# Patient Record
Sex: MALE | Race: OTHER | HISPANIC OR LATINO
[De-identification: names, ages, dates, MRNs, and addresses within clinical notes are randomized per-mention and may not be internally consistent; named-entity substitution may affect disease eponyms.]

---

## 2019-12-16 ENCOUNTER — TRANSCRIPTION ENCOUNTER (OUTPATIENT)
Age: 54
End: 2019-12-16

## 2019-12-17 PROBLEM — Z00.00 ENCOUNTER FOR PREVENTIVE HEALTH EXAMINATION: Status: ACTIVE | Noted: 2019-12-17

## 2019-12-19 ENCOUNTER — APPOINTMENT (OUTPATIENT)
Dept: OTOLARYNGOLOGY | Facility: CLINIC | Age: 54
End: 2019-12-19
Payer: COMMERCIAL

## 2019-12-19 VITALS — HEART RATE: 62 BPM | DIASTOLIC BLOOD PRESSURE: 60 MMHG | WEIGHT: 180 LBS | SYSTOLIC BLOOD PRESSURE: 120 MMHG

## 2019-12-19 DIAGNOSIS — Z86.39 PERSONAL HISTORY OF OTHER ENDOCRINE, NUTRITIONAL AND METABOLIC DISEASE: ICD-10-CM

## 2019-12-19 DIAGNOSIS — R22.1 LOCALIZED SWELLING, MASS AND LUMP, NECK: ICD-10-CM

## 2019-12-19 PROCEDURE — 99204 OFFICE O/P NEW MOD 45 MIN: CPT | Mod: 25

## 2019-12-19 PROCEDURE — 31575 DIAGNOSTIC LARYNGOSCOPY: CPT

## 2019-12-19 NOTE — PHYSICAL EXAM
[Laryngoscopy Performed] : laryngoscopy was performed, see procedure section for findings [Midline] : trachea located in midline position [Normal] : no rashes [de-identified] : bulky adenopathy in right neck

## 2019-12-19 NOTE — PROCEDURE
[Unable to Cooperate with Mirror] : patient unable to cooperate with mirror [Lesion] : lesion identified by mirror examination needing further evaluation [Topical Lidocaine] : topical lidocaine [Oxymetazoline HCl] : oxymetazoline HCl [Flexible Endoscope] : examined with the flexible endoscope [Serial Number: ___] : Serial Number: [unfilled] [True Vocal Cords Paralysis] : no true vocal cord paralysis [True Vocal Cords Erythematous] : no true vocal cord edema [True Vocal Cords Macdonald's Nodules] : no true vocal cord nodules [Glottis Arytenoid Cartilages] : no arytenoid granulomas [Glottis Arytenoid Cartilages Erythema] : no arytenoid erythema [Normal] : posterior cricoid area had healthy pink mucosa in the interarytenoid area and the esophageal inlet [Present] : absent

## 2019-12-19 NOTE — REVIEW OF SYSTEMS
[Seasonal Allergies] : seasonal allergies [Sneezing] : sneezing [Throat Pain] : throat pain [Swelling Neck] : swelling neck [Swelling Face] : face swelling [Swollen Glands] : swollen glands [Joint Pain] : joint pain [Negative] : Heme/Lymph [de-identified] : Feel warmer than others [FreeTextEntry2] : Daytime Sleepiness [FreeTextEntry9] : Muscle aches

## 2019-12-19 NOTE — CONSULT LETTER
[Dear  ___] : Dear  [unfilled], [Consult Letter:] : I had the pleasure of evaluating your patient, [unfilled]. [Please see my note below.] : Please see my note below. [Consult Closing:] : Thank you very much for allowing me to participate in the care of this patient.  If you have any questions, please do not hesitate to contact me. [Sincerely,] : Sincerely, [FreeTextEntry2] : Kiya Joshi, DO [FreeTextEntry3] : Rahul Boykin MD, FACS\par Clinical \par Dept. of Otolaryngology and Head & Neck Surgery\par [facility]\par

## 2019-12-19 NOTE — REVIEW OF SYSTEMS
[Seasonal Allergies] : seasonal allergies [Sneezing] : sneezing [Swelling Neck] : swelling neck [Throat Pain] : throat pain [Swelling Face] : face swelling [Swollen Glands] : swollen glands [Joint Pain] : joint pain [Negative] : Endocrine [de-identified] : Feel warmer than others [FreeTextEntry2] : Daytime Sleepiness [FreeTextEntry9] : Muscle aches

## 2019-12-19 NOTE — ASSESSMENT
[FreeTextEntry1] : Pt with right neck mass, suspicious for malignancy.  Pt to get sono guided FNA  and will f/u once it is completed.

## 2019-12-19 NOTE — HISTORY OF PRESENT ILLNESS
[de-identified] : 53 y/o M with a h/o right neck swelling about 4 years ago.  The swelling eventually went away.  3 months ago the swelling recurred.  He expected it to go away as it had before, but it persists.  He states that it recently became mildly tender.

## 2019-12-19 NOTE — HISTORY OF PRESENT ILLNESS
[de-identified] : 55 y/o M with a h/o right neck swelling about 4 years ago.  The swelling eventually went away.  3 months ago the swelling recurred.  He expected it to go away as it had before, but it persists.  He states that it recently became mildly tender.

## 2019-12-19 NOTE — CONSULT LETTER
[Dear  ___] : Dear  [unfilled], [Consult Letter:] : I had the pleasure of evaluating your patient, [unfilled]. [Please see my note below.] : Please see my note below. [Consult Closing:] : Thank you very much for allowing me to participate in the care of this patient.  If you have any questions, please do not hesitate to contact me. [Sincerely,] : Sincerely, [FreeTextEntry2] : Kiya Joshi, DO [FreeTextEntry3] : Rahul Boykin MD, FACS\par Clinical \par Dept. of Otolaryngology and Head & Neck Surgery\par [facility]\par

## 2019-12-19 NOTE — PHYSICAL EXAM
[Laryngoscopy Performed] : laryngoscopy was performed, see procedure section for findings [Midline] : trachea located in midline position [Normal] : no rashes [de-identified] : bulky adenopathy in right neck

## 2019-12-20 ENCOUNTER — OUTPATIENT (OUTPATIENT)
Dept: OUTPATIENT SERVICES | Facility: HOSPITAL | Age: 54
LOS: 1 days | End: 2019-12-20

## 2019-12-20 DIAGNOSIS — Z00.8 ENCOUNTER FOR OTHER GENERAL EXAMINATION: ICD-10-CM

## 2019-12-23 ENCOUNTER — FORM ENCOUNTER (OUTPATIENT)
Age: 54
End: 2019-12-23

## 2019-12-24 ENCOUNTER — APPOINTMENT (OUTPATIENT)
Dept: ULTRASOUND IMAGING | Facility: CLINIC | Age: 54
End: 2019-12-24

## 2019-12-24 ENCOUNTER — OUTPATIENT (OUTPATIENT)
Dept: OUTPATIENT SERVICES | Facility: HOSPITAL | Age: 54
LOS: 1 days | End: 2019-12-24
Payer: MEDICAID

## 2019-12-24 ENCOUNTER — RESULT REVIEW (OUTPATIENT)
Age: 54
End: 2019-12-24

## 2019-12-24 DIAGNOSIS — Z00.8 ENCOUNTER FOR OTHER GENERAL EXAMINATION: ICD-10-CM

## 2019-12-24 PROCEDURE — 10005 FNA BX W/US GDN 1ST LES: CPT

## 2019-12-24 PROCEDURE — 88173 CYTOPATH EVAL FNA REPORT: CPT | Mod: 26

## 2020-01-09 ENCOUNTER — APPOINTMENT (OUTPATIENT)
Dept: OTOLARYNGOLOGY | Facility: CLINIC | Age: 55
End: 2020-01-09
Payer: COMMERCIAL

## 2020-01-09 ENCOUNTER — FORM ENCOUNTER (OUTPATIENT)
Age: 55
End: 2020-01-09

## 2020-01-09 VITALS
BODY MASS INDEX: 30.05 KG/M2 | SYSTOLIC BLOOD PRESSURE: 152 MMHG | OXYGEN SATURATION: 98 % | HEIGHT: 64 IN | DIASTOLIC BLOOD PRESSURE: 90 MMHG | WEIGHT: 176 LBS | HEART RATE: 70 BPM

## 2020-01-09 PROCEDURE — 99214 OFFICE O/P EST MOD 30 MIN: CPT

## 2020-01-09 NOTE — DATA REVIEWED
[de-identified] : Path (Neck mass FNA) - Sono suggests that the R neck mass emanates from the thyroid.  Path = Papillary CA.

## 2020-01-09 NOTE — CONSULT LETTER
[Dear  ___] : Dear  [unfilled], [Courtesy Letter:] : I had the pleasure of seeing your patient, [unfilled], in my office today. [Please see my note below.] : Please see my note below. [Sincerely,] : Sincerely, [Consult Closing:] : Thank you very much for allowing me to participate in the care of this patient.  If you have any questions, please do not hesitate to contact me. [FreeTextEntry2] : Kiya Joshi, DO [FreeTextEntry3] : Rahul Boykin MD, FACS\par Clinical \par Dept. of Otolaryngology and Head & Neck Surgery\par [facility]\par

## 2020-01-09 NOTE — PHYSICAL EXAM
[Midline] : trachea located in midline position [Normal] : no rashes [de-identified] : Bulky adenopathy in R neck

## 2020-01-09 NOTE — ASSESSMENT
[FreeTextEntry1] : Pt to get neck MRI to clarify whether metastatic nodes are present or not. Will schedule surgery once MRI is done.

## 2020-01-10 ENCOUNTER — OUTPATIENT (OUTPATIENT)
Dept: OUTPATIENT SERVICES | Facility: HOSPITAL | Age: 55
LOS: 1 days | End: 2020-01-10
Payer: MEDICAID

## 2020-01-10 ENCOUNTER — APPOINTMENT (OUTPATIENT)
Dept: MRI IMAGING | Facility: CLINIC | Age: 55
End: 2020-01-10
Payer: COMMERCIAL

## 2020-01-10 DIAGNOSIS — C73 MALIGNANT NEOPLASM OF THYROID GLAND: ICD-10-CM

## 2020-01-10 PROCEDURE — 70030 X-RAY EYE FOR FOREIGN BODY: CPT | Mod: 26,50

## 2020-01-10 PROCEDURE — 70250 X-RAY EXAM OF SKULL: CPT | Mod: 26

## 2020-01-10 PROCEDURE — 70542 MRI ORBIT/FACE/NECK W/DYE: CPT | Mod: 26

## 2020-01-16 ENCOUNTER — APPOINTMENT (OUTPATIENT)
Dept: OTOLARYNGOLOGY | Facility: CLINIC | Age: 55
End: 2020-01-16
Payer: COMMERCIAL

## 2020-01-16 VITALS
OXYGEN SATURATION: 98 % | SYSTOLIC BLOOD PRESSURE: 140 MMHG | WEIGHT: 176 LBS | HEART RATE: 78 BPM | BODY MASS INDEX: 30.05 KG/M2 | HEIGHT: 64 IN | DIASTOLIC BLOOD PRESSURE: 80 MMHG

## 2020-01-16 PROCEDURE — 99215 OFFICE O/P EST HI 40 MIN: CPT

## 2020-01-16 NOTE — HISTORY OF PRESENT ILLNESS
[de-identified] : Pt here for f/u after MRI neck.  He has a large right sided papillary thyroid CA and had an MRI to assess for mets.

## 2020-01-16 NOTE — CONSULT LETTER
[Dear  ___] : Dear  [unfilled], [Consult Closing:] : Thank you very much for allowing me to participate in the care of this patient.  If you have any questions, please do not hesitate to contact me. [Please see my note below.] : Please see my note below. [Courtesy Letter:] : I had the pleasure of seeing your patient, [unfilled], in my office today. [FreeTextEntry2] : Kiya Joshi, DO [Sincerely,] : Sincerely, [FreeTextEntry3] : Rahul Boykin MD, FACS\par Clinical \par Dept. of Otolaryngology and Head & Neck Surgery\par [facility]\par

## 2020-01-16 NOTE — DATA REVIEWED
[de-identified] : MRI neck - Images reviewed and show the large cystic thyroid mass.  There is also bilateral adenopathy.

## 2020-01-16 NOTE — ASSESSMENT
[FreeTextEntry1] : Pt will need total thyroidectomy with central and bilateral II - IV dissections.  Risks of thyroid surgery were discussed with patient including, but not limited to:\par Recurrent nerve injury with resulting temporary/permanent hoarseness, and respiratory difficulty\par Parathyroid injury with resulting hypocalcemia and need for long term calcium and Vit. D supplementation\par Rare potential need for tracheostomy\par Tumor recurrence\par Scarring/keloid formation\par Need for additional surgery\par Lifetime need for thyroid hormone replacement\par \par Risks of neck dissection were d/w pt in detail, including, but not limited to, bleeding with potential need for transfusion, infection, scarring. nerve injuries (marginal, hypoglossal, lingual, vagus, phrenic, spinal accessory and cervical sensory branches), and fistula formation.\par \par

## 2020-01-16 NOTE — PHYSICAL EXAM
[de-identified] : Bulky adenopathy in R neck [Midline] : trachea located in midline position [Normal] : no rashes

## 2020-01-27 ENCOUNTER — OUTPATIENT (OUTPATIENT)
Dept: OUTPATIENT SERVICES | Facility: HOSPITAL | Age: 55
LOS: 1 days | End: 2020-01-27
Payer: MEDICAID

## 2020-01-27 VITALS
TEMPERATURE: 98 F | HEART RATE: 73 BPM | SYSTOLIC BLOOD PRESSURE: 170 MMHG | HEIGHT: 65 IN | DIASTOLIC BLOOD PRESSURE: 90 MMHG | OXYGEN SATURATION: 99 % | RESPIRATION RATE: 16 BRPM | WEIGHT: 182.1 LBS

## 2020-01-27 DIAGNOSIS — C73 MALIGNANT NEOPLASM OF THYROID GLAND: ICD-10-CM

## 2020-01-27 DIAGNOSIS — Z98.890 OTHER SPECIFIED POSTPROCEDURAL STATES: Chronic | ICD-10-CM

## 2020-01-27 DIAGNOSIS — Z01.818 ENCOUNTER FOR OTHER PREPROCEDURAL EXAMINATION: ICD-10-CM

## 2020-01-27 LAB
ANION GAP SERPL CALC-SCNC: 14 MMO/L — SIGNIFICANT CHANGE UP (ref 7–14)
BLD GP AB SCN SERPL QL: NEGATIVE — SIGNIFICANT CHANGE UP
BUN SERPL-MCNC: 20 MG/DL — SIGNIFICANT CHANGE UP (ref 7–23)
CALCIUM SERPL-MCNC: 9.2 MG/DL — SIGNIFICANT CHANGE UP (ref 8.4–10.5)
CHLORIDE SERPL-SCNC: 100 MMOL/L — SIGNIFICANT CHANGE UP (ref 98–107)
CO2 SERPL-SCNC: 25 MMOL/L — SIGNIFICANT CHANGE UP (ref 22–31)
CREAT SERPL-MCNC: 0.92 MG/DL — SIGNIFICANT CHANGE UP (ref 0.5–1.3)
GLUCOSE SERPL-MCNC: 92 MG/DL — SIGNIFICANT CHANGE UP (ref 70–99)
HCT VFR BLD CALC: 45.4 % — SIGNIFICANT CHANGE UP (ref 39–50)
HGB BLD-MCNC: 15.3 G/DL — SIGNIFICANT CHANGE UP (ref 13–17)
MCHC RBC-ENTMCNC: 29.4 PG — SIGNIFICANT CHANGE UP (ref 27–34)
MCHC RBC-ENTMCNC: 33.7 % — SIGNIFICANT CHANGE UP (ref 32–36)
MCV RBC AUTO: 87.3 FL — SIGNIFICANT CHANGE UP (ref 80–100)
NRBC # FLD: 0 K/UL — SIGNIFICANT CHANGE UP (ref 0–0)
PLATELET # BLD AUTO: 335 K/UL — SIGNIFICANT CHANGE UP (ref 150–400)
PMV BLD: 9.5 FL — SIGNIFICANT CHANGE UP (ref 7–13)
POTASSIUM SERPL-MCNC: 4 MMOL/L — SIGNIFICANT CHANGE UP (ref 3.5–5.3)
POTASSIUM SERPL-SCNC: 4 MMOL/L — SIGNIFICANT CHANGE UP (ref 3.5–5.3)
RBC # BLD: 5.2 M/UL — SIGNIFICANT CHANGE UP (ref 4.2–5.8)
RBC # FLD: 12.3 % — SIGNIFICANT CHANGE UP (ref 10.3–14.5)
RH IG SCN BLD-IMP: POSITIVE — SIGNIFICANT CHANGE UP
SODIUM SERPL-SCNC: 139 MMOL/L — SIGNIFICANT CHANGE UP (ref 135–145)
WBC # BLD: 7.68 K/UL — SIGNIFICANT CHANGE UP (ref 3.8–10.5)
WBC # FLD AUTO: 7.68 K/UL — SIGNIFICANT CHANGE UP (ref 3.8–10.5)

## 2020-01-27 PROCEDURE — 93010 ELECTROCARDIOGRAM REPORT: CPT

## 2020-01-27 RX ORDER — SODIUM CHLORIDE 9 MG/ML
1000 INJECTION, SOLUTION INTRAVENOUS
Refills: 0 | Status: DISCONTINUED | OUTPATIENT
Start: 2020-01-31 | End: 2020-02-01

## 2020-01-27 NOTE — H&P PST ADULT - SOURCE OF INFORMATION, PROFILE
patient
1. I was told the name of the doctor(s) who took care of my child while in the hospital.    2. I have been told about any important findings on my child's plan of care.    3. The doctor clearly explained my child's diagnosis and other possible diagnoses that were considered.    4. My child's doctor explained all the tests that were done and their results (if available). I understand that some of the test results may not be ready before we go home and I was told how I can get these results. I understand that a summary of my child's hospitalization and important test results will be shared with my child's outpatient doctor.    5. My child's doctor talked to me about what I need to do when we go home.    6. I understand what signs and symptoms to watch for. I understand what symptoms I would need to call my doctor for and/or return to the hospital.    7. I have the phone number to call the hospital for results and/or questions after I leave the hospital.

## 2020-01-27 NOTE — H&P PST ADULT - NSICDXPROBLEM_GEN_ALL_CORE_FT
PROBLEM DIAGNOSES  Problem: Thyroid ca  Assessment and Plan: Pt scheduled for surgery and preop instructions including instructions for taking Famotidine and for Chlorhexidine use in showering on the day of surgery, given verbally and with use of  written materials, and patient confirming understanding of such instructions using  teach back   method.  OR Booking notified of risk for sleep apnea   Pt to see PCP for elevated BP in PST - forms given for [facility]

## 2020-01-27 NOTE — H&P PST ADULT - HISTORY OF PRESENT ILLNESS
Pt is a 54 yr old male scheduled for Total Thyroidectomy with Level VI and Bilateral Level II-IV Neck Dissections Parathyroid Reimplantations with Dr Boykin 1/31/20. Pt states he noted swelling right neck 2 yrs ago but was treated wiht antibiotics and swelling resolved. Pt again noted swelling 2 months ago and had biopsy that was positive for CA - pt c/o of increased size over past month. Pt denies dysphagia or pain.

## 2020-01-27 NOTE — H&P PST ADULT - MALLAMPATI CLASS
Class II - visualization of the soft palate, fauces, and uvula/with phonation with phonation/Class III - visualization of the soft palate and the base of the uvula

## 2020-01-27 NOTE — H&P PST ADULT - ENDOCRINE COMMENTS
Thyroid CA - swelling on right side of neck - pt had Biopsy 12/24/19 - pt states BW done for thyroid function

## 2020-01-27 NOTE — H&P PST ADULT - NEGATIVE ENMT SYMPTOMS
no tinnitus/no vertigo/no sinus symptoms/no throat pain/no dysphagia/no ear pain/no hearing difficulty

## 2020-01-27 NOTE — H&P PST ADULT - NSANTHOSAYNRD_GEN_A_CORE
No. ÁNGEL screening performed.  STOP BANG Legend: 0-2 = LOW Risk; 3-4 = INTERMEDIATE Risk; 5-8 = HIGH Risk

## 2020-01-29 PROBLEM — E66.9 OBESITY, UNSPECIFIED: Chronic | Status: ACTIVE | Noted: 2020-01-27

## 2020-01-29 PROBLEM — C73 MALIGNANT NEOPLASM OF THYROID GLAND: Chronic | Status: ACTIVE | Noted: 2020-01-27

## 2020-01-30 ENCOUNTER — TRANSCRIPTION ENCOUNTER (OUTPATIENT)
Age: 55
End: 2020-01-30

## 2020-01-30 NOTE — ASU PATIENT PROFILE, ADULT - REASON FOR ADMISSION, PROFILE
"Im having a thyroidectomy & maybe a neck dissection on both sides and he might have to take they parathyroid out as well'

## 2020-01-31 ENCOUNTER — APPOINTMENT (OUTPATIENT)
Dept: OTOLARYNGOLOGY | Facility: HOSPITAL | Age: 55
End: 2020-01-31

## 2020-01-31 ENCOUNTER — INPATIENT (INPATIENT)
Facility: HOSPITAL | Age: 55
LOS: 9 days | Discharge: HOME | End: 2020-02-10
Attending: OTOLARYNGOLOGY | Admitting: OTOLARYNGOLOGY
Payer: MEDICAID

## 2020-01-31 ENCOUNTER — RESULT REVIEW (OUTPATIENT)
Age: 55
End: 2020-01-31

## 2020-01-31 VITALS
SYSTOLIC BLOOD PRESSURE: 144 MMHG | WEIGHT: 182.1 LBS | HEART RATE: 76 BPM | OXYGEN SATURATION: 95 % | DIASTOLIC BLOOD PRESSURE: 77 MMHG | RESPIRATION RATE: 16 BRPM | HEIGHT: 65 IN | TEMPERATURE: 98 F

## 2020-01-31 DIAGNOSIS — Z98.890 OTHER SPECIFIED POSTPROCEDURAL STATES: Chronic | ICD-10-CM

## 2020-01-31 DIAGNOSIS — C73 MALIGNANT NEOPLASM OF THYROID GLAND: ICD-10-CM

## 2020-01-31 LAB — RH IG SCN BLD-IMP: POSITIVE — SIGNIFICANT CHANGE UP

## 2020-01-31 PROCEDURE — 38724 REMOVAL OF LYMPH NODES NECK: CPT | Mod: 50,GC

## 2020-01-31 PROCEDURE — 88307 TISSUE EXAM BY PATHOLOGIST: CPT | Mod: 26

## 2020-01-31 PROCEDURE — 60240 REMOVAL OF THYROID: CPT | Mod: GC

## 2020-01-31 PROCEDURE — 88305 TISSUE EXAM BY PATHOLOGIST: CPT | Mod: 26

## 2020-01-31 RX ORDER — FENTANYL CITRATE 50 UG/ML
25 INJECTION INTRAVENOUS
Refills: 0 | Status: DISCONTINUED | OUTPATIENT
Start: 2020-01-31 | End: 2020-02-01

## 2020-01-31 RX ORDER — LEVOTHYROXINE SODIUM 125 MCG
137 TABLET ORAL DAILY
Refills: 0 | Status: DISCONTINUED | OUTPATIENT
Start: 2020-01-31 | End: 2020-02-04

## 2020-01-31 RX ORDER — ONDANSETRON 8 MG/1
4 TABLET, FILM COATED ORAL DAILY
Refills: 0 | Status: DISCONTINUED | OUTPATIENT
Start: 2020-01-31 | End: 2020-02-01

## 2020-01-31 RX ORDER — ACETAMINOPHEN 500 MG
650 TABLET ORAL EVERY 6 HOURS
Refills: 0 | Status: DISCONTINUED | OUTPATIENT
Start: 2020-01-31 | End: 2020-02-10

## 2020-01-31 RX ORDER — LISINOPRIL/HYDROCHLOROTHIAZIDE 10-12.5 MG
1 TABLET ORAL
Qty: 0 | Refills: 0 | DISCHARGE

## 2020-01-31 RX ORDER — HEPARIN SODIUM 5000 [USP'U]/ML
5000 INJECTION INTRAVENOUS; SUBCUTANEOUS EVERY 12 HOURS
Refills: 0 | Status: DISCONTINUED | OUTPATIENT
Start: 2020-01-31 | End: 2020-02-10

## 2020-01-31 RX ORDER — HYDROMORPHONE HYDROCHLORIDE 2 MG/ML
0.5 INJECTION INTRAMUSCULAR; INTRAVENOUS; SUBCUTANEOUS
Refills: 0 | Status: DISCONTINUED | OUTPATIENT
Start: 2020-01-31 | End: 2020-02-01

## 2020-01-31 RX ORDER — HYDROMORPHONE HYDROCHLORIDE 2 MG/ML
1 INJECTION INTRAMUSCULAR; INTRAVENOUS; SUBCUTANEOUS
Refills: 0 | Status: DISCONTINUED | OUTPATIENT
Start: 2020-01-31 | End: 2020-02-01

## 2020-01-31 RX ORDER — CALCIUM CARBONATE 500(1250)
1 TABLET ORAL EVERY 8 HOURS
Refills: 0 | Status: DISCONTINUED | OUTPATIENT
Start: 2020-01-31 | End: 2020-02-01

## 2020-01-31 RX ORDER — OXYCODONE HYDROCHLORIDE 5 MG/1
5 TABLET ORAL EVERY 6 HOURS
Refills: 0 | Status: DISCONTINUED | OUTPATIENT
Start: 2020-01-31 | End: 2020-02-07

## 2020-01-31 RX ORDER — OXYCODONE HYDROCHLORIDE 5 MG/1
10 TABLET ORAL EVERY 6 HOURS
Refills: 0 | Status: DISCONTINUED | OUTPATIENT
Start: 2020-01-31 | End: 2020-02-07

## 2020-01-31 RX ADMIN — SODIUM CHLORIDE 30 MILLILITER(S): 9 INJECTION, SOLUTION INTRAVENOUS at 13:57

## 2020-01-31 NOTE — BRIEF OPERATIVE NOTE - SPECIMENS
R neck dissection 2-4, L neck dissection 2-4, thyroid R thyroid with R neck dissection 2-4, L neck dissection 2-4, L thyroid

## 2020-01-31 NOTE — BRIEF OPERATIVE NOTE - NSICDXBRIEFPROCEDURE_GEN_ALL_CORE_FT
PROCEDURES:  Selective neck dissection 31-Jan-2020 23:18:31  Renata Marsh  Total thyroidectomy 31-Jan-2020 23:18:22  Renata Marsh

## 2020-01-31 NOTE — BRIEF OPERATIVE NOTE - NSICDXBRIEFPOSTOP_GEN_ALL_CORE_FT
POST-OP DIAGNOSIS:  Metastasis to cervical lymph node 02-Feb-2020 15:54:22  Rahul Boykin  Papillary thyroid carcinoma 02-Feb-2020 15:54:11  Rahul Boykin

## 2020-01-31 NOTE — BRIEF OPERATIVE NOTE - NSICDXBRIEFPREOP_GEN_ALL_CORE_FT
PRE-OP DIAGNOSIS:  Metastasis to cervical lymph node 02-Feb-2020 15:53:55  Rahul Boykin  Papillary thyroid carcinoma 02-Feb-2020 15:53:46  Rahul Boykin

## 2020-01-31 NOTE — BRIEF OPERATIVE NOTE - OPERATION/FINDINGS
bilateral neck dissection, total thyroid, sacrifice R IJ Massive Right thyroid tumor, invading strap muscles.  Mass contiguous with matted nodes in R neck.  Nodes invaded R IJ vein.  Both recurrent nerves were spared.  L parathyroids were lateralized and preserved with blood supply intact.

## 2020-02-01 ENCOUNTER — OUTPATIENT (OUTPATIENT)
Dept: OUTPATIENT SERVICES | Facility: HOSPITAL | Age: 55
LOS: 1 days | End: 2020-02-01
Payer: MEDICAID

## 2020-02-01 DIAGNOSIS — Z98.890 OTHER SPECIFIED POSTPROCEDURAL STATES: Chronic | ICD-10-CM

## 2020-02-01 LAB
ANION GAP SERPL CALC-SCNC: 14 MMO/L — SIGNIFICANT CHANGE UP (ref 7–14)
BUN SERPL-MCNC: 19 MG/DL — SIGNIFICANT CHANGE UP (ref 7–23)
CALCIUM SERPL-MCNC: 7.5 MG/DL — LOW (ref 8.4–10.5)
CALCIUM SERPL-MCNC: 7.7 MG/DL — LOW (ref 8.4–10.5)
CALCIUM SERPL-MCNC: 7.9 MG/DL — LOW (ref 8.4–10.5)
CALCIUM SERPL-MCNC: 8 MG/DL — LOW (ref 8.4–10.5)
CHLORIDE SERPL-SCNC: 103 MMOL/L — SIGNIFICANT CHANGE UP (ref 98–107)
CO2 SERPL-SCNC: 21 MMOL/L — LOW (ref 22–31)
CREAT SERPL-MCNC: 1.01 MG/DL — SIGNIFICANT CHANGE UP (ref 0.5–1.3)
GLUCOSE SERPL-MCNC: 139 MG/DL — HIGH (ref 70–99)
HCT VFR BLD CALC: 39 % — SIGNIFICANT CHANGE UP (ref 39–50)
HGB BLD-MCNC: 13.3 G/DL — SIGNIFICANT CHANGE UP (ref 13–17)
MAGNESIUM SERPL-MCNC: 1.4 MG/DL — LOW (ref 1.6–2.6)
MCHC RBC-ENTMCNC: 29.4 PG — SIGNIFICANT CHANGE UP (ref 27–34)
MCHC RBC-ENTMCNC: 34.1 % — SIGNIFICANT CHANGE UP (ref 32–36)
MCV RBC AUTO: 86.3 FL — SIGNIFICANT CHANGE UP (ref 80–100)
NRBC # FLD: 0 K/UL — SIGNIFICANT CHANGE UP (ref 0–0)
PHOSPHATE SERPL-MCNC: 4.5 MG/DL — SIGNIFICANT CHANGE UP (ref 2.5–4.5)
PLATELET # BLD AUTO: 238 K/UL — SIGNIFICANT CHANGE UP (ref 150–400)
PMV BLD: 9.2 FL — SIGNIFICANT CHANGE UP (ref 7–13)
POTASSIUM SERPL-MCNC: 4 MMOL/L — SIGNIFICANT CHANGE UP (ref 3.5–5.3)
POTASSIUM SERPL-SCNC: 4 MMOL/L — SIGNIFICANT CHANGE UP (ref 3.5–5.3)
PTH-INTACT SERPL-MCNC: 4.49 PG/ML — LOW (ref 15–65)
RBC # BLD: 4.52 M/UL — SIGNIFICANT CHANGE UP (ref 4.2–5.8)
RBC # FLD: 12.4 % — SIGNIFICANT CHANGE UP (ref 10.3–14.5)
SODIUM SERPL-SCNC: 138 MMOL/L — SIGNIFICANT CHANGE UP (ref 135–145)
TRIGL FLD-MCNC: 308 MG/DL — SIGNIFICANT CHANGE UP
WBC # BLD: 10.55 K/UL — HIGH (ref 3.8–10.5)
WBC # FLD AUTO: 10.55 K/UL — HIGH (ref 3.8–10.5)

## 2020-02-01 PROCEDURE — G9001: CPT

## 2020-02-01 PROCEDURE — 93010 ELECTROCARDIOGRAM REPORT: CPT

## 2020-02-01 RX ORDER — CALCIUM CARBONATE 500(1250)
2 TABLET ORAL THREE TIMES A DAY
Refills: 0 | Status: DISCONTINUED | OUTPATIENT
Start: 2020-02-01 | End: 2020-02-04

## 2020-02-01 RX ORDER — SENNA PLUS 8.6 MG/1
2 TABLET ORAL AT BEDTIME
Refills: 0 | Status: DISCONTINUED | OUTPATIENT
Start: 2020-02-01 | End: 2020-02-10

## 2020-02-01 RX ORDER — POLYETHYLENE GLYCOL 3350 17 G/17G
17 POWDER, FOR SOLUTION ORAL DAILY
Refills: 0 | Status: DISCONTINUED | OUTPATIENT
Start: 2020-02-01 | End: 2020-02-10

## 2020-02-01 RX ORDER — CALCITRIOL 0.5 UG/1
0.5 CAPSULE ORAL
Refills: 0 | Status: DISCONTINUED | OUTPATIENT
Start: 2020-02-01 | End: 2020-02-04

## 2020-02-01 RX ORDER — CALCIUM CARBONATE 500(1250)
1 TABLET ORAL THREE TIMES A DAY
Refills: 0 | Status: DISCONTINUED | OUTPATIENT
Start: 2020-02-01 | End: 2020-02-01

## 2020-02-01 RX ORDER — MAGNESIUM SULFATE 500 MG/ML
2 VIAL (ML) INJECTION EVERY 4 HOURS
Refills: 0 | Status: COMPLETED | OUTPATIENT
Start: 2020-02-01 | End: 2020-02-01

## 2020-02-01 RX ORDER — CALCIUM CARBONATE 500(1250)
2 TABLET ORAL THREE TIMES A DAY
Refills: 0 | Status: DISCONTINUED | OUTPATIENT
Start: 2020-02-01 | End: 2020-02-01

## 2020-02-01 RX ORDER — CALCIUM CARBONATE 500(1250)
2 TABLET ORAL ONCE
Refills: 0 | Status: COMPLETED | OUTPATIENT
Start: 2020-02-01 | End: 2020-02-01

## 2020-02-01 RX ADMIN — HYDROMORPHONE HYDROCHLORIDE 0.5 MILLIGRAM(S): 2 INJECTION INTRAMUSCULAR; INTRAVENOUS; SUBCUTANEOUS at 02:05

## 2020-02-01 RX ADMIN — Medication 2 TABLET(S): at 15:05

## 2020-02-01 RX ADMIN — Medication 650 MILLIGRAM(S): at 13:36

## 2020-02-01 RX ADMIN — OXYCODONE HYDROCHLORIDE 5 MILLIGRAM(S): 5 TABLET ORAL at 20:50

## 2020-02-01 RX ADMIN — Medication 100 GRAM(S): at 12:04

## 2020-02-01 RX ADMIN — SODIUM CHLORIDE 30 MILLILITER(S): 9 INJECTION, SOLUTION INTRAVENOUS at 00:00

## 2020-02-01 RX ADMIN — OXYCODONE HYDROCHLORIDE 5 MILLIGRAM(S): 5 TABLET ORAL at 05:30

## 2020-02-01 RX ADMIN — HYDROMORPHONE HYDROCHLORIDE 0.5 MILLIGRAM(S): 2 INJECTION INTRAMUSCULAR; INTRAVENOUS; SUBCUTANEOUS at 02:55

## 2020-02-01 RX ADMIN — Medication 2 TABLET(S): at 16:17

## 2020-02-01 RX ADMIN — Medication 100 GRAM(S): at 15:05

## 2020-02-01 RX ADMIN — CALCITRIOL 0.5 MICROGRAM(S): 0.5 CAPSULE ORAL at 19:03

## 2020-02-01 RX ADMIN — HEPARIN SODIUM 5000 UNIT(S): 5000 INJECTION INTRAVENOUS; SUBCUTANEOUS at 06:03

## 2020-02-01 RX ADMIN — OXYCODONE HYDROCHLORIDE 5 MILLIGRAM(S): 5 TABLET ORAL at 05:00

## 2020-02-01 RX ADMIN — HYDROMORPHONE HYDROCHLORIDE 0.5 MILLIGRAM(S): 2 INJECTION INTRAMUSCULAR; INTRAVENOUS; SUBCUTANEOUS at 01:51

## 2020-02-01 RX ADMIN — Medication 137 MICROGRAM(S): at 06:02

## 2020-02-01 RX ADMIN — HYDROMORPHONE HYDROCHLORIDE 0.5 MILLIGRAM(S): 2 INJECTION INTRAMUSCULAR; INTRAVENOUS; SUBCUTANEOUS at 03:10

## 2020-02-01 RX ADMIN — Medication 650 MILLIGRAM(S): at 12:03

## 2020-02-01 RX ADMIN — Medication 2 TABLET(S): at 21:56

## 2020-02-01 RX ADMIN — Medication 650 MILLIGRAM(S): at 19:04

## 2020-02-01 RX ADMIN — HEPARIN SODIUM 5000 UNIT(S): 5000 INJECTION INTRAVENOUS; SUBCUTANEOUS at 19:03

## 2020-02-01 RX ADMIN — Medication 650 MILLIGRAM(S): at 20:50

## 2020-02-01 RX ADMIN — OXYCODONE HYDROCHLORIDE 5 MILLIGRAM(S): 5 TABLET ORAL at 19:30

## 2020-02-01 RX ADMIN — Medication 1 TABLET(S): at 06:02

## 2020-02-01 NOTE — PROVIDER CONTACT NOTE (OTHER) - RECOMMENDATIONS
Ekg , place nc for comfort , pain meds given as per orders . Vital signs assessed . Ent into see patient .

## 2020-02-01 NOTE — PROGRESS NOTE ADULT - SUBJECTIVE AND OBJECTIVE BOX
Patient seen and examined, no acute events overnight. Transferred from PACU once nasal airway removed.     T(C): 37.1 (02-01-20 @ 04:00), Max: 37.1 (02-01-20 @ 04:00)  HR: 98 (02-01-20 @ 06:00) (76 - 101)  BP: 132/83 (02-01-20 @ 06:00) (115/81 - 144/77)  RR: 12 (02-01-20 @ 06:00) (12 - 26)  SpO2: 97% (02-01-20 @ 06:00) (95% - 100%)  Well appearing, NAD  Breathing comfortably on RA, no stridor, no stertor  NC clear anteriorly, no epistaxis  OC mucosa pink and moist,   Neck with horizontal incision, c/d/i, steristrips in place, mild barbie-incisional edema, no hematoma, no crepitus, no skin changes, tender to palpation, bl neck JPs with SS output, R intermittently holding suction - on LIWS   Chvostek negative                          13.3   10.55 )-----------( 238      ( 01 Feb 2020 02:55 )             39.0   02-01    138  |  103  |  19  ----------------------------<  139<H>  4.0   |  21<L>  |  1.01    Ca    8.0<L>      01 Feb 2020 02:55  Phos  4.5     02-01  Mg     1.4     02-01      A/P: 53yo M with PMH thyroid cancer s/p total thyroidectomy, bl SND, central neck dissection 1/31, recovering appropriately.   - continuous pulse ox  - q6h calciums until stable  - continue calcium 1250 TID  - regular diet  - pain control  - bowel regimen  - OOB  - SQH for DVT ppx

## 2020-02-02 LAB
ALBUMIN SERPL ELPH-MCNC: 3.7 G/DL — SIGNIFICANT CHANGE UP (ref 3.3–5)
ANION GAP SERPL CALC-SCNC: 12 MMO/L — SIGNIFICANT CHANGE UP (ref 7–14)
BUN SERPL-MCNC: 20 MG/DL — SIGNIFICANT CHANGE UP (ref 7–23)
CALCIUM SERPL-MCNC: 6.5 MG/DL — CRITICAL LOW (ref 8.4–10.5)
CALCIUM SERPL-MCNC: 6.9 MG/DL — LOW (ref 8.4–10.5)
CALCIUM SERPL-MCNC: 7.2 MG/DL — LOW (ref 8.4–10.5)
CALCIUM SERPL-MCNC: 7.5 MG/DL — LOW (ref 8.4–10.5)
CHLORIDE SERPL-SCNC: 100 MMOL/L — SIGNIFICANT CHANGE UP (ref 98–107)
CO2 SERPL-SCNC: 26 MMOL/L — SIGNIFICANT CHANGE UP (ref 22–31)
CREAT SERPL-MCNC: 1 MG/DL — SIGNIFICANT CHANGE UP (ref 0.5–1.3)
GLUCOSE SERPL-MCNC: 106 MG/DL — HIGH (ref 70–99)
MAGNESIUM SERPL-MCNC: 1.8 MG/DL — SIGNIFICANT CHANGE UP (ref 1.6–2.6)
PHOSPHATE SERPL-MCNC: 3.5 MG/DL — SIGNIFICANT CHANGE UP (ref 2.5–4.5)
POTASSIUM SERPL-MCNC: 3.8 MMOL/L — SIGNIFICANT CHANGE UP (ref 3.5–5.3)
POTASSIUM SERPL-SCNC: 3.8 MMOL/L — SIGNIFICANT CHANGE UP (ref 3.5–5.3)
SODIUM SERPL-SCNC: 138 MMOL/L — SIGNIFICANT CHANGE UP (ref 135–145)

## 2020-02-02 RX ORDER — MAGNESIUM SULFATE 500 MG/ML
2 VIAL (ML) INJECTION ONCE
Refills: 0 | Status: COMPLETED | OUTPATIENT
Start: 2020-02-02 | End: 2020-02-02

## 2020-02-02 RX ORDER — CALCIUM GLUCONATE 100 MG/ML
1 VIAL (ML) INTRAVENOUS
Refills: 0 | Status: COMPLETED | OUTPATIENT
Start: 2020-02-02 | End: 2020-02-02

## 2020-02-02 RX ORDER — POTASSIUM CHLORIDE 20 MEQ
20 PACKET (EA) ORAL ONCE
Refills: 0 | Status: COMPLETED | OUTPATIENT
Start: 2020-02-02 | End: 2020-02-02

## 2020-02-02 RX ORDER — OCTREOTIDE ACETATE 200 UG/ML
100 INJECTION, SOLUTION INTRAVENOUS; SUBCUTANEOUS EVERY 8 HOURS
Refills: 0 | Status: DISCONTINUED | OUTPATIENT
Start: 2020-02-02 | End: 2020-02-07

## 2020-02-02 RX ORDER — CALCIUM GLUCONATE 100 MG/ML
1 VIAL (ML) INTRAVENOUS
Refills: 0 | Status: COMPLETED | OUTPATIENT
Start: 2020-02-02 | End: 2020-02-03

## 2020-02-02 RX ORDER — CALCIUM GLUCONATE 100 MG/ML
2 VIAL (ML) INTRAVENOUS ONCE
Refills: 0 | Status: DISCONTINUED | OUTPATIENT
Start: 2020-02-02 | End: 2020-02-02

## 2020-02-02 RX ORDER — SODIUM CHLORIDE 9 MG/ML
1000 INJECTION, SOLUTION INTRAVENOUS
Refills: 0 | Status: DISCONTINUED | OUTPATIENT
Start: 2020-02-02 | End: 2020-02-02

## 2020-02-02 RX ORDER — OCTREOTIDE ACETATE 200 UG/ML
50 INJECTION, SOLUTION INTRAVENOUS; SUBCUTANEOUS
Refills: 0 | Status: DISCONTINUED | OUTPATIENT
Start: 2020-02-02 | End: 2020-02-02

## 2020-02-02 RX ADMIN — OCTREOTIDE ACETATE 100 MICROGRAM(S): 200 INJECTION, SOLUTION INTRAVENOUS; SUBCUTANEOUS at 14:14

## 2020-02-02 RX ADMIN — Medication 100 GRAM(S): at 23:33

## 2020-02-02 RX ADMIN — Medication 650 MILLIGRAM(S): at 18:19

## 2020-02-02 RX ADMIN — OXYCODONE HYDROCHLORIDE 5 MILLIGRAM(S): 5 TABLET ORAL at 13:01

## 2020-02-02 RX ADMIN — Medication 650 MILLIGRAM(S): at 07:45

## 2020-02-02 RX ADMIN — Medication 50 GRAM(S): at 13:09

## 2020-02-02 RX ADMIN — HEPARIN SODIUM 5000 UNIT(S): 5000 INJECTION INTRAVENOUS; SUBCUTANEOUS at 17:50

## 2020-02-02 RX ADMIN — Medication 2 TABLET(S): at 13:09

## 2020-02-02 RX ADMIN — Medication 650 MILLIGRAM(S): at 07:16

## 2020-02-02 RX ADMIN — OXYCODONE HYDROCHLORIDE 5 MILLIGRAM(S): 5 TABLET ORAL at 03:29

## 2020-02-02 RX ADMIN — CALCITRIOL 0.5 MICROGRAM(S): 0.5 CAPSULE ORAL at 17:50

## 2020-02-02 RX ADMIN — Medication 50 GRAM(S): at 14:15

## 2020-02-02 RX ADMIN — Medication 650 MILLIGRAM(S): at 23:34

## 2020-02-02 RX ADMIN — Medication 137 MICROGRAM(S): at 05:36

## 2020-02-02 RX ADMIN — Medication 2 TABLET(S): at 23:34

## 2020-02-02 RX ADMIN — OCTREOTIDE ACETATE 100 MICROGRAM(S): 200 INJECTION, SOLUTION INTRAVENOUS; SUBCUTANEOUS at 23:34

## 2020-02-02 RX ADMIN — POLYETHYLENE GLYCOL 3350 17 GRAM(S): 17 POWDER, FOR SOLUTION ORAL at 13:01

## 2020-02-02 RX ADMIN — OXYCODONE HYDROCHLORIDE 5 MILLIGRAM(S): 5 TABLET ORAL at 19:47

## 2020-02-02 RX ADMIN — Medication 650 MILLIGRAM(S): at 17:49

## 2020-02-02 RX ADMIN — OXYCODONE HYDROCHLORIDE 5 MILLIGRAM(S): 5 TABLET ORAL at 04:00

## 2020-02-02 RX ADMIN — HEPARIN SODIUM 5000 UNIT(S): 5000 INJECTION INTRAVENOUS; SUBCUTANEOUS at 05:36

## 2020-02-02 RX ADMIN — CALCITRIOL 0.5 MICROGRAM(S): 0.5 CAPSULE ORAL at 05:36

## 2020-02-02 RX ADMIN — Medication 20 MILLIEQUIVALENT(S): at 07:16

## 2020-02-02 RX ADMIN — Medication 2 TABLET(S): at 05:36

## 2020-02-02 RX ADMIN — OXYCODONE HYDROCHLORIDE 5 MILLIGRAM(S): 5 TABLET ORAL at 20:17

## 2020-02-02 RX ADMIN — OXYCODONE HYDROCHLORIDE 5 MILLIGRAM(S): 5 TABLET ORAL at 13:31

## 2020-02-02 RX ADMIN — Medication 50 GRAM(S): at 07:16

## 2020-02-02 NOTE — PROGRESS NOTE ADULT - SUBJECTIVE AND OBJECTIVE BOX
Patient seen and examined at bedside. No acute events overnight. PTH 4.4, cCa 7.9, 8.1, 7.7, 7.7, Alb 3.7. No numbness or tingling in fingertips or toes, no perioral numbness or tingling. Drain output milky, sent for triglycerides 308, now NPO, IVF    Well appearing, NAD  Breathing comfortably on RA, no stridor, no stertor  NC clear anteriorly, no epistaxis  OC mucosa pink and moist,   Neck with horizontal incision, c/d/i, steristrips in place, mild barbie-incisional edema, no hematoma, no crepitus, no skin changes, tender to palpation, bl neck JPs with milkly output (r>L), R intermittently holding suction   Chvostek negative    A/P: 53yo M with PMH thyroid cancer s/p total thyroidectomy, bl SND, central neck dissection 1/31, c/b chyle leak.   - NPO/IVF  - q6h calciums until stable  - continue calcium 2500 TID, rocal bid   - levothyroxine  - pain control  - bowel regimen  - OOB  - SQH for DVT ppx

## 2020-02-03 ENCOUNTER — TRANSCRIPTION ENCOUNTER (OUTPATIENT)
Age: 55
End: 2020-02-03

## 2020-02-03 LAB
ANION GAP SERPL CALC-SCNC: 12 MMO/L — SIGNIFICANT CHANGE UP (ref 7–14)
ANION GAP SERPL CALC-SCNC: 13 MMO/L — SIGNIFICANT CHANGE UP (ref 7–14)
BUN SERPL-MCNC: 12 MG/DL — SIGNIFICANT CHANGE UP (ref 7–23)
BUN SERPL-MCNC: 12 MG/DL — SIGNIFICANT CHANGE UP (ref 7–23)
CALCIUM SERPL-MCNC: 7 MG/DL — LOW (ref 8.4–10.5)
CALCIUM SERPL-MCNC: 7 MG/DL — LOW (ref 8.4–10.5)
CALCIUM SERPL-MCNC: 7.1 MG/DL — LOW (ref 8.4–10.5)
CALCIUM SERPL-MCNC: 7.1 MG/DL — LOW (ref 8.4–10.5)
CHLORIDE SERPL-SCNC: 97 MMOL/L — LOW (ref 98–107)
CHLORIDE SERPL-SCNC: 98 MMOL/L — SIGNIFICANT CHANGE UP (ref 98–107)
CO2 SERPL-SCNC: 26 MMOL/L — SIGNIFICANT CHANGE UP (ref 22–31)
CO2 SERPL-SCNC: 27 MMOL/L — SIGNIFICANT CHANGE UP (ref 22–31)
CREAT SERPL-MCNC: 0.94 MG/DL — SIGNIFICANT CHANGE UP (ref 0.5–1.3)
CREAT SERPL-MCNC: 0.96 MG/DL — SIGNIFICANT CHANGE UP (ref 0.5–1.3)
GLUCOSE SERPL-MCNC: 113 MG/DL — HIGH (ref 70–99)
GLUCOSE SERPL-MCNC: 133 MG/DL — HIGH (ref 70–99)
MAGNESIUM SERPL-MCNC: 1.5 MG/DL — LOW (ref 1.6–2.6)
MAGNESIUM SERPL-MCNC: 1.6 MG/DL — SIGNIFICANT CHANGE UP (ref 1.6–2.6)
PHOSPHATE SERPL-MCNC: 4.4 MG/DL — SIGNIFICANT CHANGE UP (ref 2.5–4.5)
PHOSPHATE SERPL-MCNC: 5 MG/DL — HIGH (ref 2.5–4.5)
POTASSIUM SERPL-MCNC: 4.1 MMOL/L — SIGNIFICANT CHANGE UP (ref 3.5–5.3)
POTASSIUM SERPL-MCNC: 4.3 MMOL/L — SIGNIFICANT CHANGE UP (ref 3.5–5.3)
POTASSIUM SERPL-SCNC: 4.1 MMOL/L — SIGNIFICANT CHANGE UP (ref 3.5–5.3)
POTASSIUM SERPL-SCNC: 4.3 MMOL/L — SIGNIFICANT CHANGE UP (ref 3.5–5.3)
SODIUM SERPL-SCNC: 136 MMOL/L — SIGNIFICANT CHANGE UP (ref 135–145)
SODIUM SERPL-SCNC: 137 MMOL/L — SIGNIFICANT CHANGE UP (ref 135–145)
TRIGL FLD-MCNC: 37 MG/DL — SIGNIFICANT CHANGE UP

## 2020-02-03 RX ORDER — HYDROCHLOROTHIAZIDE 25 MG
25 TABLET ORAL DAILY
Refills: 0 | Status: DISCONTINUED | OUTPATIENT
Start: 2020-02-03 | End: 2020-02-10

## 2020-02-03 RX ORDER — LISINOPRIL 2.5 MG/1
20 TABLET ORAL DAILY
Refills: 0 | Status: DISCONTINUED | OUTPATIENT
Start: 2020-02-03 | End: 2020-02-10

## 2020-02-03 RX ORDER — SENNA PLUS 8.6 MG/1
2 TABLET ORAL AT BEDTIME
Refills: 0 | Status: DISCONTINUED | OUTPATIENT
Start: 2020-02-03 | End: 2020-02-03

## 2020-02-03 RX ADMIN — Medication 2 TABLET(S): at 05:53

## 2020-02-03 RX ADMIN — Medication 2 TABLET(S): at 13:17

## 2020-02-03 RX ADMIN — CALCITRIOL 0.5 MICROGRAM(S): 0.5 CAPSULE ORAL at 05:53

## 2020-02-03 RX ADMIN — LISINOPRIL 20 MILLIGRAM(S): 2.5 TABLET ORAL at 07:23

## 2020-02-03 RX ADMIN — OCTREOTIDE ACETATE 100 MICROGRAM(S): 200 INJECTION, SOLUTION INTRAVENOUS; SUBCUTANEOUS at 13:17

## 2020-02-03 RX ADMIN — Medication 2 TABLET(S): at 21:47

## 2020-02-03 RX ADMIN — HEPARIN SODIUM 5000 UNIT(S): 5000 INJECTION INTRAVENOUS; SUBCUTANEOUS at 17:21

## 2020-02-03 RX ADMIN — Medication 650 MILLIGRAM(S): at 05:52

## 2020-02-03 RX ADMIN — Medication 100 GRAM(S): at 00:46

## 2020-02-03 RX ADMIN — OCTREOTIDE ACETATE 100 MICROGRAM(S): 200 INJECTION, SOLUTION INTRAVENOUS; SUBCUTANEOUS at 05:54

## 2020-02-03 RX ADMIN — Medication 650 MILLIGRAM(S): at 12:45

## 2020-02-03 RX ADMIN — Medication 650 MILLIGRAM(S): at 12:15

## 2020-02-03 RX ADMIN — Medication 650 MILLIGRAM(S): at 20:35

## 2020-02-03 RX ADMIN — CALCITRIOL 0.5 MICROGRAM(S): 0.5 CAPSULE ORAL at 17:23

## 2020-02-03 RX ADMIN — Medication 650 MILLIGRAM(S): at 06:31

## 2020-02-03 RX ADMIN — Medication 25 MILLIGRAM(S): at 07:23

## 2020-02-03 RX ADMIN — Medication 650 MILLIGRAM(S): at 20:05

## 2020-02-03 RX ADMIN — OCTREOTIDE ACETATE 100 MICROGRAM(S): 200 INJECTION, SOLUTION INTRAVENOUS; SUBCUTANEOUS at 21:47

## 2020-02-03 RX ADMIN — HEPARIN SODIUM 5000 UNIT(S): 5000 INJECTION INTRAVENOUS; SUBCUTANEOUS at 05:54

## 2020-02-03 RX ADMIN — Medication 650 MILLIGRAM(S): at 00:05

## 2020-02-03 RX ADMIN — Medication 137 MICROGRAM(S): at 05:53

## 2020-02-03 NOTE — DISCHARGE NOTE PROVIDER - CARE PROVIDER_API CALL
Rahul Boykin)  Otolaryngology  [address] [place] [address]  Phone: [phone]  Fax: [phone]  Follow Up Time:

## 2020-02-03 NOTE — DISCHARGE NOTE PROVIDER - NSDCMRMEDTOKEN_GEN_ALL_CORE_FT
lisinopril-hydrochlorothiazide 20 mg-25 mg oral tablet: 1 tab(s) orally once a day acetaminophen 325 mg oral tablet: 2 tab(s) orally every 6 hours, As needed, Mild Pain (1 - 3) MDD:6  calcitriol 0.5 mcg oral capsule: 2 cap(s) orally every 12 hours  calcium carbonate 1250 mg (500 mg elemental calcium) oral tablet: 4 tab(s) orally 4 times a day   ergocalciferol 50,000 intl units (1.25 mg) oral capsule: 1 cap(s) orally once a week  levothyroxine 150 mcg (0.15 mg) oral tablet: 1 tab(s) orally once a day   lisinopril-hydrochlorothiazide 20 mg-25 mg oral tablet: 1 tab(s) orally once a day  oseltamivir 75 mg oral capsule: 1 cap(s) orally 2 times a day acetaminophen 325 mg oral tablet: 2 tab(s) orally every 6 hours, As needed for pain/fever  calcitriol 0.5 mcg oral capsule: 2 cap(s) orally every 12 hours  calcium carbonate 1250 mg (500 mg elemental calcium) oral tablet: 4 tab(s) orally 4 times a day. Take at 8:45AM, 12:45PM, 6PM, 10PM  ergocalciferol 50,000 intl units (1.25 mg) oral capsule: 1 cap(s) orally once a week  levothyroxine 150 mcg (0.15 mg) oral tablet: 1 tab(s) orally once a day at 5AM  lisinopril-hydrochlorothiazide 20 mg-25 mg oral tablet: 1 tab(s) orally once a day  oseltamivir 75 mg oral capsule: 1 cap(s) orally 2 times a day acetaminophen 325 mg oral tablet: 2 tab(s) orally every 6 hours, As needed for pain/fever  calcitriol 0.5 mcg oral capsule: 2 cap(s) orally every 12 hours  calcium carbonate 1250 mg (500 mg elemental calcium) oral tablet: 4 tab(s) orally 4 times a day. Take at 8:45AM, 12:45PM, 6PM, 10PM  ergocalciferol 50,000 intl units (1.25 mg) oral capsule: 1 cap(s) orally once a week  levothyroxine 150 mcg (0.15 mg) oral tablet: 1 tab(s) orally once a day at 5AM  lisinopril-hydrochlorothiazide 20 mg-25 mg oral tablet: 1 tab(s) orally once a day  oseltamivir 75 mg oral capsule: 1 cap(s) orally 2 times a day  oxyCODONE 5 mg oral tablet: 1 tab(s) orally every 6 hours, As needed for Moderate to severe pain MDD:4 tabs

## 2020-02-03 NOTE — DISCHARGE NOTE PROVIDER - NSDCFUADDAPPT_GEN_ALL_CORE_FT
ENDOCRINOLOGY: Dr. Kylee Reinoso DO  [address] [place], [place] [id number]  Appointment scheduled for 2/24/20 at 11 am.   [phone].

## 2020-02-03 NOTE — DISCHARGE NOTE PROVIDER - NSDCFUSCHEDAPPT_GEN_ALL_CORE_FT
INOCENCIA THRASHER ; 02/24/2020 ; NPP Endocrin [facility] INOCENCIA THRASHER ; 02/24/2020 ; NPP Endocrin [facility] INOCENCIA THRASHER ; 02/24/2020 ; NPP Endocrin [address] [facility] INOCENCIA THRASHER ; 02/24/2020 ; NPP Endocrin [facility]

## 2020-02-03 NOTE — DISCHARGE NOTE PROVIDER - HOSPITAL COURSE
S/P total thyroidectomy with post op chyle leak... S/P total thyroidectomy with post op chyle leak. Calcium levels were trended and patient required multiple Iv doses of IV calcium. Endocrine was consulted and evaluated patient daily. Patient was H flu positive and was transferred to [address] [place] [address]. Cleared for discharge with stable calcium levels on 2/10/20. This is a 53 yo M with HTN, recently diagnosed papillary thyroid cancer admitted after total thyroidectomy, with neck dissection by ENT on 1/31, course c/b continued hypocalcemia. Calcium levels were trended and patient required multiple Iv doses of IV calcium. Endocrine was consulted and evaluated patient daily. Patient was H flu positive and started on Tamiflu. Cleared for discharge with stable calcium levels on 2/10/20 with current medication regimen. Patient has Endo follow-up appointment scheduled for 2/24 with Dr. Kylee Reinoso. This is a 53 yo M with HTN, recently diagnosed papillary thyroid cancer admitted after total thyroidectomy, with neck dissection by ENT on 1/31, course c/b continued hypocalcemia. Calcium levels were trended and patient required multiple Iv doses of IV calcium. Endocrine was consulted and evaluated patient daily. Patient was H flu positive and started on Tamiflu. Cleared for discharge with stable calcium levels on 2/10/20 with current medication regimen. Patient has Endo follow-up appointment scheduled for 2/24 with Dr. Kylee Reinoso.         istop Reference #: [id number]

## 2020-02-03 NOTE — PROGRESS NOTE ADULT - SUBJECTIVE AND OBJECTIVE BOX
Patient seen and examined at bedside.   No acute events overnight.   No numbness or tingling in fingertips or toes, no perioral numbness or tingling.   Tolerating clear liquid diet    Well appearing, NAD  Breathing comfortably on RA, no stridor, no stertor  NC clear anteriorly, no epistaxis  OC mucosa pink and moist,   Neck with horizontal incision, c/d/i, steristrips in place, mild barbie-incisional edema, no hematoma, no crepitus, no skin changes, tender to palpation, bl neck JPs with milky output (r>L), R intermittently holding suction   R ALONZO 52.5  L ALONZO 37.5  Chvostek negative    PTH 4.4  cCa 7.4, 7.1, 7.2  Alb 3.7      A/P: 53yo M with PMH thyroid cancer s/p total thyroidectomy, bl SND, central neck dissection 1/31, c/b chyle leak.   - send L ALONZO fluid for triglycerides  - q6h calciums until stable  - CLD  - continue calcium 2500 TID, rocal bid   - levothyroxine  - pain control  - bowel regimen  - OOB  - SQH for DVT ppx

## 2020-02-03 NOTE — DISCHARGE NOTE PROVIDER - NSDCCPCAREPLAN_GEN_ALL_CORE_FT
PRINCIPAL DISCHARGE DIAGNOSIS  Diagnosis: Thyroid cancer  Assessment and Plan of Treatment: PRINCIPAL DISCHARGE DIAGNOSIS  Diagnosis: Thyroid cancer  Assessment and Plan of Treatment: You had your thyroid removed. Please take your thyroid medication, synthroid or levothyroxine, every morning on an empty stomach. Please follow-up with your ENT surgeon, Dr. Boykin within 1-2 weeks of discharge.      SECONDARY DISCHARGE DIAGNOSES  Diagnosis: Hypocalcemia  Assessment and Plan of Treatment: Your calcium levels were found to be very low after your surgery. Please take your medication supplements as prescribed. Please follow-up with your endocrinologist during your schedule appointment time to have your calcium levels checked.    Diagnosis: Influenza  Assessment and Plan of Treatment: You were diagnosed with the flu. Please continue to take Tamiflu as prescribed to complete a total of 5 days. If you have fever at home, you can take acetaminophen. PRINCIPAL DISCHARGE DIAGNOSIS  Diagnosis: Thyroid cancer  Assessment and Plan of Treatment: You had your thyroid removed. Please take your thyroid medication, synthroid or levothyroxine, every morning on an empty stomach. Please follow-up with your ENT surgeon, Dr. Boykin within 1-2 weeks of discharge.      SECONDARY DISCHARGE DIAGNOSES  Diagnosis: Influenza  Assessment and Plan of Treatment: You were diagnosed with the flu. Please continue to take Tamiflu as prescribed to complete a total of 5 days. If you have fever at home, you can take acetaminophen.    Diagnosis: Hypocalcemia  Assessment and Plan of Treatment: Your calcium levels were found to be very low after your surgery. Please take your medication supplements as prescribed. Please follow-up with Dr. Kylee Reinoso on 2/24 at 11 am to have your calcium levels checked.

## 2020-02-04 DIAGNOSIS — E83.51 HYPOCALCEMIA: ICD-10-CM

## 2020-02-04 DIAGNOSIS — E89.0 POSTPROCEDURAL HYPOTHYROIDISM: ICD-10-CM

## 2020-02-04 DIAGNOSIS — C73 MALIGNANT NEOPLASM OF THYROID GLAND: ICD-10-CM

## 2020-02-04 LAB
ANION GAP SERPL CALC-SCNC: 15 MMO/L — HIGH (ref 7–14)
BUN SERPL-MCNC: 12 MG/DL — SIGNIFICANT CHANGE UP (ref 7–23)
CALCIUM SERPL-MCNC: 6.7 MG/DL — LOW (ref 8.4–10.5)
CALCIUM SERPL-MCNC: 6.9 MG/DL — LOW (ref 8.4–10.5)
CALCIUM SERPL-MCNC: 7.3 MG/DL — LOW (ref 8.4–10.5)
CALCIUM SERPL-MCNC: 7.6 MG/DL — LOW (ref 8.4–10.5)
CHLORIDE SERPL-SCNC: 95 MMOL/L — LOW (ref 98–107)
CO2 SERPL-SCNC: 26 MMOL/L — SIGNIFICANT CHANGE UP (ref 22–31)
CREAT SERPL-MCNC: 1.07 MG/DL — SIGNIFICANT CHANGE UP (ref 0.5–1.3)
GLUCOSE SERPL-MCNC: 120 MG/DL — HIGH (ref 70–99)
MAGNESIUM SERPL-MCNC: 1.5 MG/DL — LOW (ref 1.6–2.6)
PHOSPHATE SERPL-MCNC: 5.8 MG/DL — HIGH (ref 2.5–4.5)
POTASSIUM SERPL-MCNC: 3.8 MMOL/L — SIGNIFICANT CHANGE UP (ref 3.5–5.3)
POTASSIUM SERPL-SCNC: 3.8 MMOL/L — SIGNIFICANT CHANGE UP (ref 3.5–5.3)
SODIUM SERPL-SCNC: 136 MMOL/L — SIGNIFICANT CHANGE UP (ref 135–145)

## 2020-02-04 PROCEDURE — 99223 1ST HOSP IP/OBS HIGH 75: CPT | Mod: GC

## 2020-02-04 RX ORDER — LANOLIN ALCOHOL/MO/W.PET/CERES
3 CREAM (GRAM) TOPICAL AT BEDTIME
Refills: 0 | Status: DISCONTINUED | OUTPATIENT
Start: 2020-02-04 | End: 2020-02-04

## 2020-02-04 RX ORDER — MAGNESIUM SULFATE 500 MG/ML
1 VIAL (ML) INJECTION ONCE
Refills: 0 | Status: COMPLETED | OUTPATIENT
Start: 2020-02-04 | End: 2020-02-04

## 2020-02-04 RX ORDER — CALCIUM CARBONATE 500(1250)
3 TABLET ORAL
Refills: 0 | Status: DISCONTINUED | OUTPATIENT
Start: 2020-02-04 | End: 2020-02-05

## 2020-02-04 RX ORDER — CALCIUM GLUCONATE 100 MG/ML
1 VIAL (ML) INTRAVENOUS ONCE
Refills: 0 | Status: DISCONTINUED | OUTPATIENT
Start: 2020-02-04 | End: 2020-02-04

## 2020-02-04 RX ORDER — MAGNESIUM SULFATE 500 MG/ML
2 VIAL (ML) INJECTION ONCE
Refills: 0 | Status: COMPLETED | OUTPATIENT
Start: 2020-02-04 | End: 2020-02-04

## 2020-02-04 RX ORDER — CALCIUM GLUCONATE 100 MG/ML
1 VIAL (ML) INTRAVENOUS ONCE
Refills: 0 | Status: COMPLETED | OUTPATIENT
Start: 2020-02-04 | End: 2020-02-04

## 2020-02-04 RX ORDER — CALCITRIOL 0.5 UG/1
0.5 CAPSULE ORAL
Refills: 0 | Status: DISCONTINUED | OUTPATIENT
Start: 2020-02-05 | End: 2020-02-07

## 2020-02-04 RX ORDER — CALCITRIOL 0.5 UG/1
1 CAPSULE ORAL
Refills: 0 | Status: DISCONTINUED | OUTPATIENT
Start: 2020-02-04 | End: 2020-02-04

## 2020-02-04 RX ORDER — LEVOTHYROXINE SODIUM 125 MCG
150 TABLET ORAL
Refills: 0 | Status: DISCONTINUED | OUTPATIENT
Start: 2020-02-04 | End: 2020-02-10

## 2020-02-04 RX ORDER — LANOLIN ALCOHOL/MO/W.PET/CERES
5 CREAM (GRAM) TOPICAL AT BEDTIME
Refills: 0 | Status: DISCONTINUED | OUTPATIENT
Start: 2020-02-04 | End: 2020-02-04

## 2020-02-04 RX ORDER — LANOLIN ALCOHOL/MO/W.PET/CERES
3 CREAM (GRAM) TOPICAL AT BEDTIME
Refills: 0 | Status: DISCONTINUED | OUTPATIENT
Start: 2020-02-04 | End: 2020-02-10

## 2020-02-04 RX ADMIN — Medication 650 MILLIGRAM(S): at 02:58

## 2020-02-04 RX ADMIN — Medication 650 MILLIGRAM(S): at 02:28

## 2020-02-04 RX ADMIN — Medication 650 MILLIGRAM(S): at 22:19

## 2020-02-04 RX ADMIN — Medication 50 GRAM(S): at 11:27

## 2020-02-04 RX ADMIN — HEPARIN SODIUM 5000 UNIT(S): 5000 INJECTION INTRAVENOUS; SUBCUTANEOUS at 17:58

## 2020-02-04 RX ADMIN — Medication 650 MILLIGRAM(S): at 14:21

## 2020-02-04 RX ADMIN — LISINOPRIL 20 MILLIGRAM(S): 2.5 TABLET ORAL at 05:11

## 2020-02-04 RX ADMIN — HEPARIN SODIUM 5000 UNIT(S): 5000 INJECTION INTRAVENOUS; SUBCUTANEOUS at 05:11

## 2020-02-04 RX ADMIN — Medication 2 TABLET(S): at 05:11

## 2020-02-04 RX ADMIN — CALCITRIOL 1 MICROGRAM(S): 0.5 CAPSULE ORAL at 17:58

## 2020-02-04 RX ADMIN — Medication 137 MICROGRAM(S): at 07:50

## 2020-02-04 RX ADMIN — Medication 100 GRAM(S): at 11:47

## 2020-02-04 RX ADMIN — Medication 3 MILLIGRAM(S): at 02:39

## 2020-02-04 RX ADMIN — Medication 3 TABLET(S): at 17:58

## 2020-02-04 RX ADMIN — CALCITRIOL 0.5 MICROGRAM(S): 0.5 CAPSULE ORAL at 05:11

## 2020-02-04 RX ADMIN — OCTREOTIDE ACETATE 100 MICROGRAM(S): 200 INJECTION, SOLUTION INTRAVENOUS; SUBCUTANEOUS at 13:50

## 2020-02-04 RX ADMIN — Medication 650 MILLIGRAM(S): at 21:49

## 2020-02-04 RX ADMIN — Medication 25 MILLIGRAM(S): at 05:11

## 2020-02-04 RX ADMIN — Medication 600 MILLIGRAM(S): at 02:21

## 2020-02-04 RX ADMIN — Medication 650 MILLIGRAM(S): at 13:51

## 2020-02-04 RX ADMIN — Medication 3 TABLET(S): at 11:48

## 2020-02-04 RX ADMIN — OCTREOTIDE ACETATE 100 MICROGRAM(S): 200 INJECTION, SOLUTION INTRAVENOUS; SUBCUTANEOUS at 05:11

## 2020-02-04 RX ADMIN — OCTREOTIDE ACETATE 100 MICROGRAM(S): 200 INJECTION, SOLUTION INTRAVENOUS; SUBCUTANEOUS at 21:49

## 2020-02-04 RX ADMIN — Medication 100 GRAM(S): at 02:21

## 2020-02-04 RX ADMIN — Medication 50 GRAM(S): at 10:38

## 2020-02-04 NOTE — CONSULT NOTE ADULT - PROBLEM SELECTOR RECOMMENDATION 2
- Continue with levothyroxine 137 mcg qd, to be given in on empty stomach, wait at least half an hr before eating or taking other meds and should be 4 hrs apart from calcium or MVI - Recommend to increase levothyroxine to 150 mcg qd, to be given in on empty stomach, wait at least half an hr before eating or taking other meds and should be 4 hrs apart from calcium or MVI  - Check TFTs as outpatient in 4 weeks   - Goal TSH 0.5-2 for now, might need more aggressive goal based on path results

## 2020-02-04 NOTE — CONSULT NOTE ADULT - SUBJECTIVE AND OBJECTIVE BOX
HPI:  Pt is a 54 yr old male recently diagnosed with papillary thyroid cancer scheduled for Total Thyroidectomy with Level VI and Bilateral Level II-IV Neck Dissections Parathyroid Reimplantations with Dr Boykin 1/31/20. (27 Jan 2020 07:10)    Endocrine history:      PAST MEDICAL & SURGICAL HISTORY:  Hypertension, unspecified type  Obesity  Thyroid ca  History of ankle surgery: Left ankle fracture - hardware placed      FAMILY HISTORY:  Family history of any hormonal disorders:     Social History:  Smoking, alcohol or illicit drug use:       Outpatient Medications:      MEDICATIONS  (STANDING):  calcitriol   Capsule 1 MICROGram(s) Oral two times a day  calcium carbonate   1250 mG (OsCal) 3 Tablet(s) Oral four times a day  heparin  Injectable 5000 Unit(s) SubCutaneous every 12 hours  hydrochlorothiazide 25 milliGRAM(s) Oral daily  levothyroxine 137 MICROGram(s) Oral daily  lisinopril 20 milliGRAM(s) Oral daily  octreotide  Injectable 100 MICROGram(s) SubCutaneous every 8 hours  polyethylene glycol 3350 17 Gram(s) Oral daily  senna 2 Tablet(s) Oral at bedtime    MEDICATIONS  (PRN):  acetaminophen   Tablet .. 650 milliGRAM(s) Oral every 6 hours PRN Mild Pain (1 - 3)  guaiFENesin  milliGRAM(s) Oral every 12 hours PRN congestion  melatonin 3 milliGRAM(s) Oral at bedtime PRN Insomnia  oxyCODONE    IR 5 milliGRAM(s) Oral every 6 hours PRN Moderate Pain (4 - 6)  oxyCODONE    IR 10 milliGRAM(s) Oral every 6 hours PRN Severe Pain (7 - 10)      Allergies  No Known Allergies      Review of Systems:  Constitutional: No fever, good appetite/po intake  Eyes: No blurry vision, diplopia  Neuro: No tremors  HEENT: No pain  Cardiovascular: No chest pain, palpitations  Respiratory: No SOB, no cough  GI: No nausea, vomiting,   : No dysuria, hematuria  Skin: no rash  Psych: no depression  Endocrine: no polyuria, polydipsia  Hem/lymph: no swelling  Osteoporosis: no fractures    ALL OTHER SYSTEMS REVIEWED AND NEGATIVE      PHYSICAL EXAM:  VITALS: T(C): 36.6 (02-04-20 @ 05:07)  T(F): 97.9 (02-04-20 @ 05:07), Max: 99.4 (02-04-20 @ 01:25)  HR: 72 (02-04-20 @ 05:07) (72 - 85)  BP: 122/89 (02-04-20 @ 05:07) (122/89 - 137/87)  RR:  (16 - 17)  SpO2:  (94% - 97%)  Wt(kg): --  GENERAL: NAD, well-groomed, well-developed  EYES: No proptosis, no lid lag, anicteric  HEENT:  Atraumatic, Normocephalic, moist mucous membranes  THYROID: Normal size, no palpable nodules  RESPIRATORY: Clear to auscultation bilaterally; No rales, rhonchi, wheezing, or rubs  CARDIOVASCULAR: Regular rate and rhythm; No murmurs; no peripheral edema  GI: Soft, nontender, non distended, normal bowel sounds  SKIN: Dry, intact, No rashes or lesions  NEURO: AAO x 3, no gross or focal deficit  PSYCH: reactive affect, euthymic mood        02-04    136  |  95<L>  |  12  ----------------------------<  120<H>  3.8   |  26  |  1.07    EGFR if : 90  EGFR if non : 77    Ca    7.6<L>      02-04  Mg     1.5     02-04  Phos  5.8     02-04    TPro  x   /  Alb  3.7  /  TBili  x   /  DBili  x   /  AST  x   /  ALT  x   /  AlkPhos  x   02-02 HPI:  Pt is a 54 yr old male recently diagnosed with papillary thyroid cancer scheduled for Total Thyroidectomy with Level VI and Bilateral Level II-IV Neck Dissections Parathyroid Reimplantations with Dr Boykin 1/31/20. (27 Jan 2020 07:10)    Endocrine history:  Patient is a 54 year old male who was recently diagnosed with papillary thyroid cancer. Patient initially noted to have right sided neck swelling about 2 years ago but was treated with antibiotics for sore throat and swelling resolved so did not follow up since then. Now again noted rt neck swelling 2 months ago but did not improve and was increasing in size over the past month so underwent FNA which came back positive for papillary thyroid cancer. No prior personal or family history of any thyroid disorders and was never on thyroid medications in past. No history of radiation to head and neck in past. No past use of amiodarone, lithium or biotin supplements reported. Currently does not report any hypothyroid or hyperthyroid symptoms.     Patient underwent total Thyroidectomy with modified neck dissection levels II, III, and IV on 1/31/20. Currently on LT4 137 mcg qd. Also noted to have hypocalcemia and low intact PTH post-op and currently on calcium replacements.       PAST MEDICAL & SURGICAL HISTORY:  Hypertension, unspecified type  Obesity  Thyroid ca  History of ankle surgery: Left ankle fracture - hardware placed      FAMILY HISTORY:  No family history of any thyroid disorders    Social History:  No smoking, alcohol or illicit drug use:       Outpatient Medications:  Lisinopril/ HCTZ 20/25 mg qd      MEDICATIONS  (STANDING):  calcitriol   Capsule 1 MICROGram(s) Oral two times a day  calcium carbonate   1250 mG (OsCal) 3 Tablet(s) Oral four times a day  heparin  Injectable 5000 Unit(s) SubCutaneous every 12 hours  hydrochlorothiazide 25 milliGRAM(s) Oral daily  levothyroxine 137 MICROGram(s) Oral daily  lisinopril 20 milliGRAM(s) Oral daily  octreotide  Injectable 100 MICROGram(s) SubCutaneous every 8 hours  polyethylene glycol 3350 17 Gram(s) Oral daily  senna 2 Tablet(s) Oral at bedtime    MEDICATIONS  (PRN):  acetaminophen   Tablet .. 650 milliGRAM(s) Oral every 6 hours PRN Mild Pain (1 - 3)  guaiFENesin  milliGRAM(s) Oral every 12 hours PRN congestion  melatonin 3 milliGRAM(s) Oral at bedtime PRN Insomnia  oxyCODONE    IR 5 milliGRAM(s) Oral every 6 hours PRN Moderate Pain (4 - 6)  oxyCODONE    IR 10 milliGRAM(s) Oral every 6 hours PRN Severe Pain (7 - 10)      Allergies  No Known Allergies      Review of Systems:  Constitutional: No fever, good appetite/po intake  Eyes: No blurry vision, diplopia  Neuro: No barbie-oral numbness or tingling   HEENT: slight neck pain  Cardiovascular: No chest pain, palpitations  Respiratory: No SOB, no cough  GI: No nausea, vomiting or abdominal pain  : No dysuria, hematuria  Endocrine: no polyuria, polydipsia  Hem/lymph: no swelling    ALL OTHER SYSTEMS REVIEWED AND NEGATIVE      PHYSICAL EXAM:  VITALS: T(C): 36.6 (02-04-20 @ 05:07)  T(F): 97.9 (02-04-20 @ 05:07), Max: 99.4 (02-04-20 @ 01:25)  HR: 72 (02-04-20 @ 05:07) (72 - 85)  BP: 122/89 (02-04-20 @ 05:07) (122/89 - 137/87)  RR:  (16 - 17)  SpO2:  (94% - 97%)  Wt(kg): --  GENERAL: NAD, well-groomed, well-developed  EYES: No proptosis, anicteric  HEENT:  Atraumatic, Normocephalic, moist mucous membranes  NECK: dressing in place, 2 ALONZO drains in place  THYROID: s/p total thyroidectomy   RESPIRATORY: Clear to auscultation bilaterally; No rales, rhonchi, wheezing, or rubs  CARDIOVASCULAR: Regular rate and rhythm; No murmurs; no peripheral edema  GI: Soft, nontender, non distended, normal bowel sounds  SKIN: Dry, intact, No rashes or lesions  NEURO: AAO x 3, no gross or focal deficit  PSYCH: reactive affect, euthymic mood        02-04    136  |  95<L>  |  12  ----------------------------<  120<H>  3.8   |  26  |  1.07    EGFR if : 90  EGFR if non : 77    Ca    7.6<L>      02-04  Mg     1.5     02-04  Phos  5.8     02-04    TPro  x   /  Alb  3.7  /  TBili  x   /  DBili  x   /  AST  x   /  ALT  x   /  AlkPhos  x   02-02      Parathyroid Hormone Intact, Serum (02.01.20 @ 03:00): 4.49 pg/mL HPI:  Pt is a 54 yr old male recently diagnosed with papillary thyroid cancer scheduled for Total Thyroidectomy with Level VI and Bilateral Level II-IV Neck Dissections Parathyroid Reimplantations with Dr Boykin 1/31/20. (27 Jan 2020 07:10)    Endocrine history:  Patient is a 54 year old male who was recently diagnosed with papillary thyroid cancer. Patient initially noted to have right sided neck swelling about 2 years ago but was treated with antibiotics for sore throat and swelling resolved so did not follow up since then. Now again noted rt neck swelling 2 months ago but did not improve and was increasing in size over the past month so underwent MRI neck which showed 6.6 x 5.8 x 4.9 cm cystic mass arising from the upper pole of the right thyroid lobe with an enhancing mural nodule. FNA of right mass came back positive for papillary thyroid cancer. No prior personal or family history of any thyroid disorders and was never on thyroid medications in past. No history of radiation to head and neck in past. No past use of amiodarone, lithium or biotin supplements reported. Currently does not report any hypothyroid or hyperthyroid symptoms.     Patient underwent total Thyroidectomy with modified neck dissection levels II, III, and IV on 1/31/20. Currently on LT4 137 mcg qd. Also noted to have hypocalcemia and low intact PTH post-op and currently on calcium replacements.       PAST MEDICAL & SURGICAL HISTORY:  Hypertension, unspecified type  Obesity  Thyroid ca  History of ankle surgery: Left ankle fracture - hardware placed      FAMILY HISTORY:  No family history of any thyroid disorders    Social History:  No smoking, alcohol or illicit drug use:       Outpatient Medications:  Lisinopril/ HCTZ 20/25 mg qd      MEDICATIONS  (STANDING):  calcitriol   Capsule 1 MICROGram(s) Oral two times a day  calcium carbonate   1250 mG (OsCal) 3 Tablet(s) Oral four times a day  heparin  Injectable 5000 Unit(s) SubCutaneous every 12 hours  hydrochlorothiazide 25 milliGRAM(s) Oral daily  levothyroxine 137 MICROGram(s) Oral daily  lisinopril 20 milliGRAM(s) Oral daily  octreotide  Injectable 100 MICROGram(s) SubCutaneous every 8 hours  polyethylene glycol 3350 17 Gram(s) Oral daily  senna 2 Tablet(s) Oral at bedtime    MEDICATIONS  (PRN):  acetaminophen   Tablet .. 650 milliGRAM(s) Oral every 6 hours PRN Mild Pain (1 - 3)  guaiFENesin  milliGRAM(s) Oral every 12 hours PRN congestion  melatonin 3 milliGRAM(s) Oral at bedtime PRN Insomnia  oxyCODONE    IR 5 milliGRAM(s) Oral every 6 hours PRN Moderate Pain (4 - 6)  oxyCODONE    IR 10 milliGRAM(s) Oral every 6 hours PRN Severe Pain (7 - 10)      Allergies  No Known Allergies      Review of Systems:  Constitutional: No fever, good appetite/po intake  Eyes: No blurry vision, diplopia  Neuro: No barbie-oral numbness or tingling   HEENT: slight neck pain  Cardiovascular: No chest pain, palpitations  Respiratory: No SOB, no cough  GI: No nausea, vomiting or abdominal pain  : No dysuria, hematuria  Endocrine: no polyuria, polydipsia  Hem/lymph: no swelling    ALL OTHER SYSTEMS REVIEWED AND NEGATIVE      PHYSICAL EXAM:  VITALS: T(C): 36.6 (02-04-20 @ 05:07)  T(F): 97.9 (02-04-20 @ 05:07), Max: 99.4 (02-04-20 @ 01:25)  HR: 72 (02-04-20 @ 05:07) (72 - 85)  BP: 122/89 (02-04-20 @ 05:07) (122/89 - 137/87)  RR:  (16 - 17)  SpO2:  (94% - 97%)  Wt(kg): --  GENERAL: NAD, well-groomed, well-developed  EYES: No proptosis, anicteric  HEENT:  Atraumatic, Normocephalic, moist mucous membranes  NECK: dressing in place, 2 ALONZO drains in place  THYROID: s/p total thyroidectomy   RESPIRATORY: Clear to auscultation bilaterally; No rales, rhonchi, wheezing, or rubs  CARDIOVASCULAR: Regular rate and rhythm; No murmurs; no peripheral edema  GI: Soft, nontender, non distended, normal bowel sounds  SKIN: Dry, intact, No rashes or lesions  NEURO: AAO x 3, no gross or focal deficit  PSYCH: reactive affect, euthymic mood        02-04    136  |  95<L>  |  12  ----------------------------<  120<H>  3.8   |  26  |  1.07    EGFR if : 90  EGFR if non : 77    Ca    7.6<L>      02-04  Mg     1.5     02-04  Phos  5.8     02-04    TPro  x   /  Alb  3.7  /  TBili  x   /  DBili  x   /  AST  x   /  ALT  x   /  AlkPhos  x   02-02      Parathyroid Hormone Intact, Serum (02.01.20 @ 03:00): 4.49 pg/mL

## 2020-02-04 NOTE — PROGRESS NOTE ADULT - SUBJECTIVE AND OBJECTIVE BOX
Patient seen and examined at bedside.   No acute events overnight.   had some perioral tingling overnight, but not in the am  Tolerating clear liquid diet    Vital Signs Last 24 Hrs  T(C): 36.6 (04 Feb 2020 05:07), Max: 37.4 (04 Feb 2020 01:25)  T(F): 97.9 (04 Feb 2020 05:07), Max: 99.4 (04 Feb 2020 01:25)  HR: 72 (04 Feb 2020 05:07) (72 - 85)  BP: 122/89 (04 Feb 2020 05:07) (122/89 - 143/94)  BP(mean): --  RR: 17 (04 Feb 2020 05:07) (16 - 17)  SpO2: 95% (04 Feb 2020 05:07) (94% - 97%)    Well appearing, NAD  Breathing comfortably on RA, no stridor, no stertor  NC clear anteriorly, no epistaxis  OC mucosa pink and moist,   Neck with horizontal incision, c/d/i, steristrips in place, mild barbie-incisional edema, no hematoma, no crepitus, no skin changes, tender to palpation, bl neck JPs with milky output (r>L), R intermittently holding suction   R ALONZO 52.5  L ALONZO 37.5  Chvostek positive this am b/l    PTH 4.4  cCa 7.4, 7.1, 7.2, 6.9  Alb 3.7  Triglycerides, Fluid: 37: NO REFERENCE RANGE AVAILABLE. mg/dL (02.03.20 @ 10:15)      A/P: 55yo M with PMH thyroid cancer s/p total thyroidectomy, bl SND, central neck dissection 1/31, c/b chyle leak.   - q6h calciums until stable  - CLD  - continue calcium 2500 TID, rocal increase dose to 1BID  - levothyroxine  - pain control  - bowel regimen  - OOB  - SQH for DVT ppx

## 2020-02-04 NOTE — CONSULT NOTE ADULT - PROBLEM SELECTOR RECOMMENDATION 3
- s/p Total Thyroidectomy with modified neck dissection levels II, III, and IV on 1/31/20.  - follow up path result  - Continue with levothyroxine 137 mcg qd, as mentioned above. - s/p Total Thyroidectomy with modified neck dissection levels II, III, and IV on 1/31/20.  - follow up path result, based on that will consider KATE treatment as outpatient   - Continue with levothyroxine 150 mcg qd, as mentioned above.  Patient can follow up with [facility]  [address] [place] [address], [address], [place] [address]  [phone]

## 2020-02-04 NOTE — CONSULT NOTE ADULT - ATTENDING COMMENTS
54M PTC (6cm with + LN) s/p total thyroidectomy, postoperative hypothyroidism and hypoparathyroidism.  Agree with increase levothyroxine for mild TSH suppression target, but follow up pathology. Hypocalcemia - increase calcium carbonate and give with food. Calcitriol dosing limited by hyperphosphatemia. Will need outpatient endocrine follow up

## 2020-02-04 NOTE — CONSULT NOTE ADULT - PROBLEM SELECTOR RECOMMENDATION 9
- Secondary to post op hypoparathyroidism, intact PTH post op 4.49.   - Calcium level most recent noted to be 7.6; goal is 8-8.5   - Patient is currently not symptomatic   - Continue with increased dose of calcium carbonate: 1250 mg -->3 tabs 4 times a day.  - Continue with calcitriol 1 mcg bid  - Check Vitamin D 25-OH and 1,25-di OH levels   - Monitor serum calcium q8h along with daily albumin - Secondary to post op hypoparathyroidism, intact PTH post op 4.49.   - Calcium level most recent noted to be 7.6; goal is 8-8.5   - Patient is currently not symptomatic   - Continue with increased dose of calcium carbonate: 1250 mg -->3 tabs 4 times a day (with meals and at bedtime)  - Would recommend to decrease calcitriol to 0.5 mcg bid as phos level has been elevated, if persistently high would decrease further down to 0.5 mcg qd.  - Check Vitamin D 25-OH and 1,25-di OH levels   - Monitor serum calcium q8h along with daily albumin  - Can give prn calcium gluconate if serum Ca <7 or patient is symptomatic.

## 2020-02-05 LAB
ALBUMIN SERPL ELPH-MCNC: 3.8 G/DL — SIGNIFICANT CHANGE UP (ref 3.3–5)
ALBUMIN SERPL ELPH-MCNC: 3.9 G/DL — SIGNIFICANT CHANGE UP (ref 3.3–5)
ANION GAP SERPL CALC-SCNC: 13 MMO/L — SIGNIFICANT CHANGE UP (ref 7–14)
ANION GAP SERPL CALC-SCNC: 16 MMO/L — HIGH (ref 7–14)
ANION GAP SERPL CALC-SCNC: 16 MMO/L — HIGH (ref 7–14)
BUN SERPL-MCNC: 18 MG/DL — SIGNIFICANT CHANGE UP (ref 7–23)
BUN SERPL-MCNC: 19 MG/DL — SIGNIFICANT CHANGE UP (ref 7–23)
BUN SERPL-MCNC: 22 MG/DL — SIGNIFICANT CHANGE UP (ref 7–23)
CALCIUM SERPL-MCNC: 6.8 MG/DL — LOW (ref 8.4–10.5)
CALCIUM SERPL-MCNC: 7 MG/DL — LOW (ref 8.4–10.5)
CHLORIDE SERPL-SCNC: 94 MMOL/L — LOW (ref 98–107)
CHLORIDE SERPL-SCNC: 95 MMOL/L — LOW (ref 98–107)
CHLORIDE SERPL-SCNC: 97 MMOL/L — LOW (ref 98–107)
CO2 SERPL-SCNC: 26 MMOL/L — SIGNIFICANT CHANGE UP (ref 22–31)
CO2 SERPL-SCNC: 26 MMOL/L — SIGNIFICANT CHANGE UP (ref 22–31)
CO2 SERPL-SCNC: 28 MMOL/L — SIGNIFICANT CHANGE UP (ref 22–31)
CREAT SERPL-MCNC: 1.04 MG/DL — SIGNIFICANT CHANGE UP (ref 0.5–1.3)
CREAT SERPL-MCNC: 1.09 MG/DL — SIGNIFICANT CHANGE UP (ref 0.5–1.3)
CREAT SERPL-MCNC: 1.16 MG/DL — SIGNIFICANT CHANGE UP (ref 0.5–1.3)
GLUCOSE SERPL-MCNC: 114 MG/DL — HIGH (ref 70–99)
GLUCOSE SERPL-MCNC: 122 MG/DL — HIGH (ref 70–99)
GLUCOSE SERPL-MCNC: 143 MG/DL — HIGH (ref 70–99)
HCT VFR BLD CALC: 39 % — SIGNIFICANT CHANGE UP (ref 39–50)
HGB BLD-MCNC: 13.4 G/DL — SIGNIFICANT CHANGE UP (ref 13–17)
MAGNESIUM SERPL-MCNC: 1.7 MG/DL — SIGNIFICANT CHANGE UP (ref 1.6–2.6)
MAGNESIUM SERPL-MCNC: 1.8 MG/DL — SIGNIFICANT CHANGE UP (ref 1.6–2.6)
MAGNESIUM SERPL-MCNC: 1.8 MG/DL — SIGNIFICANT CHANGE UP (ref 1.6–2.6)
MCHC RBC-ENTMCNC: 29.3 PG — SIGNIFICANT CHANGE UP (ref 27–34)
MCHC RBC-ENTMCNC: 34.4 % — SIGNIFICANT CHANGE UP (ref 32–36)
MCV RBC AUTO: 85.3 FL — SIGNIFICANT CHANGE UP (ref 80–100)
NRBC # FLD: 0 K/UL — SIGNIFICANT CHANGE UP (ref 0–0)
PHOSPHATE SERPL-MCNC: 5 MG/DL — HIGH (ref 2.5–4.5)
PHOSPHATE SERPL-MCNC: 5.8 MG/DL — HIGH (ref 2.5–4.5)
PHOSPHATE SERPL-MCNC: 5.8 MG/DL — HIGH (ref 2.5–4.5)
PLATELET # BLD AUTO: 270 K/UL — SIGNIFICANT CHANGE UP (ref 150–400)
PMV BLD: 8.9 FL — SIGNIFICANT CHANGE UP (ref 7–13)
POTASSIUM SERPL-MCNC: 4 MMOL/L — SIGNIFICANT CHANGE UP (ref 3.5–5.3)
POTASSIUM SERPL-MCNC: 4.1 MMOL/L — SIGNIFICANT CHANGE UP (ref 3.5–5.3)
POTASSIUM SERPL-MCNC: 4.2 MMOL/L — SIGNIFICANT CHANGE UP (ref 3.5–5.3)
POTASSIUM SERPL-SCNC: 4 MMOL/L — SIGNIFICANT CHANGE UP (ref 3.5–5.3)
POTASSIUM SERPL-SCNC: 4.1 MMOL/L — SIGNIFICANT CHANGE UP (ref 3.5–5.3)
POTASSIUM SERPL-SCNC: 4.2 MMOL/L — SIGNIFICANT CHANGE UP (ref 3.5–5.3)
RBC # BLD: 4.57 M/UL — SIGNIFICANT CHANGE UP (ref 4.2–5.8)
RBC # FLD: 11.9 % — SIGNIFICANT CHANGE UP (ref 10.3–14.5)
SODIUM SERPL-SCNC: 136 MMOL/L — SIGNIFICANT CHANGE UP (ref 135–145)
SODIUM SERPL-SCNC: 136 MMOL/L — SIGNIFICANT CHANGE UP (ref 135–145)
SODIUM SERPL-SCNC: 139 MMOL/L — SIGNIFICANT CHANGE UP (ref 135–145)
WBC # BLD: 6.13 K/UL — SIGNIFICANT CHANGE UP (ref 3.8–10.5)
WBC # FLD AUTO: 6.13 K/UL — SIGNIFICANT CHANGE UP (ref 3.8–10.5)

## 2020-02-05 PROCEDURE — 99233 SBSQ HOSP IP/OBS HIGH 50: CPT | Mod: GC

## 2020-02-05 RX ORDER — CALCIUM CARBONATE 500(1250)
3 TABLET ORAL
Refills: 0 | Status: DISCONTINUED | OUTPATIENT
Start: 2020-02-05 | End: 2020-02-06

## 2020-02-05 RX ORDER — CALCIUM CARBONATE 500(1250)
3 TABLET ORAL
Refills: 0 | Status: DISCONTINUED | OUTPATIENT
Start: 2020-02-05 | End: 2020-02-05

## 2020-02-05 RX ADMIN — Medication 3 TABLET(S): at 22:10

## 2020-02-05 RX ADMIN — HEPARIN SODIUM 5000 UNIT(S): 5000 INJECTION INTRAVENOUS; SUBCUTANEOUS at 05:54

## 2020-02-05 RX ADMIN — Medication 3 TABLET(S): at 05:55

## 2020-02-05 RX ADMIN — Medication 25 MILLIGRAM(S): at 05:54

## 2020-02-05 RX ADMIN — CALCITRIOL 0.5 MICROGRAM(S): 0.5 CAPSULE ORAL at 05:55

## 2020-02-05 RX ADMIN — Medication 650 MILLIGRAM(S): at 09:43

## 2020-02-05 RX ADMIN — Medication 3 TABLET(S): at 18:26

## 2020-02-05 RX ADMIN — Medication 650 MILLIGRAM(S): at 10:13

## 2020-02-05 RX ADMIN — OCTREOTIDE ACETATE 100 MICROGRAM(S): 200 INJECTION, SOLUTION INTRAVENOUS; SUBCUTANEOUS at 05:53

## 2020-02-05 RX ADMIN — Medication 650 MILLIGRAM(S): at 22:39

## 2020-02-05 RX ADMIN — CALCITRIOL 0.5 MICROGRAM(S): 0.5 CAPSULE ORAL at 18:24

## 2020-02-05 RX ADMIN — Medication 3 TABLET(S): at 00:51

## 2020-02-05 RX ADMIN — Medication 650 MILLIGRAM(S): at 22:09

## 2020-02-05 RX ADMIN — OCTREOTIDE ACETATE 100 MICROGRAM(S): 200 INJECTION, SOLUTION INTRAVENOUS; SUBCUTANEOUS at 14:15

## 2020-02-05 RX ADMIN — Medication 3 TABLET(S): at 12:27

## 2020-02-05 RX ADMIN — Medication 150 MICROGRAM(S): at 06:02

## 2020-02-05 RX ADMIN — Medication 3 MILLIGRAM(S): at 00:04

## 2020-02-05 RX ADMIN — OCTREOTIDE ACETATE 100 MICROGRAM(S): 200 INJECTION, SOLUTION INTRAVENOUS; SUBCUTANEOUS at 22:10

## 2020-02-05 RX ADMIN — HEPARIN SODIUM 5000 UNIT(S): 5000 INJECTION INTRAVENOUS; SUBCUTANEOUS at 18:24

## 2020-02-05 RX ADMIN — LISINOPRIL 20 MILLIGRAM(S): 2.5 TABLET ORAL at 05:54

## 2020-02-05 NOTE — PROGRESS NOTE ADULT - PROBLEM SELECTOR PLAN 2
- Continue with levothyroxine 150 mcg qd, to be given in on empty stomach, wait at least half an hr before eating or taking other meds and should be 4 hrs apart from calcium or MVI (Noted to be given along with Ca today at 6 am, spoke to RN and reinforced to be given LT4 at 5 am and Ca with meals)  - Check TFTs as outpatient in 4 weeks   - Goal TSH 0.5-2 for now, might need more aggressive goal based on path results.

## 2020-02-05 NOTE — PROGRESS NOTE ADULT - PROBLEM SELECTOR PLAN 1
- Secondary to post op hypoparathyroidism, intact PTH post op 4.49.   - Calcium level most recent noted to be 6.8; goal is 8-8.5   - Patient is currently not symptomatic   - Continue with calcium carbonate: 1250 mg -->3 tabs 4 times a day (with meals and at bedtime). Spoke to RN to make sure its given with meals and apart from LT4  - Continue calcitriol to 0.5 mcg bid, would avoid increase as phos level has been elevated, if persistently high would decrease further down to 0.5 mcg qd.  - Check Vitamin D 25-OH and 1,25-di OH levels   - Monitor serum calcium q8h along with daily albumin, Mg and phos  - Mg today noted to be wnl, 1.8. If low can consider Mg oxide 400 mg tid with meals  - Can give prn calcium gluconate if serum Ca <7 or patient is symptomatic.

## 2020-02-05 NOTE — CHART NOTE - NSCHARTNOTEFT_GEN_A_CORE
Procedure note:  Verbal consent obtained from patient    Bilateral ALONZO removed without any complication, patient tolerated well

## 2020-02-05 NOTE — PROGRESS NOTE ADULT - ASSESSMENT
54 yr old male recently diagnosed with papillary thyroid cancer underwent Total Thyroidectomy with modified neck dissection levels II, III, and IV on 1/31/20.  Endocrine team consulted for post op hypocalcemia and post op hypothyroidism. 54 yr old male recently diagnosed with papillary thyroid cancer underwent Total Thyroidectomy with modified neck dissection levels II, III, and IV on 1/31/20.  Endocrine team following for post op hypocalcemia and post op hypothyroidism.

## 2020-02-05 NOTE — PROGRESS NOTE ADULT - PROBLEM SELECTOR PLAN 3
- s/p Total Thyroidectomy with modified neck dissection levels II, III, and IV on 1/31/20 for 6 m PTC with LN involvement.  - follow up path result, based on that will consider KATE treatment as outpatient   - Continue with levothyroxine 150 mcg qd, as mentioned above.  Patient can follow up with [facility]  [address], [address], [place] [address]  [phone].

## 2020-02-05 NOTE — PROGRESS NOTE ADULT - SUBJECTIVE AND OBJECTIVE BOX
Patient seen and examined at bedside.   No acute events overnight.   Tolerating clear liquid diet    Vital Signs Last 24 Hrs  T(C): 37.7 (05 Feb 2020 05:46), Max: 37.7 (05 Feb 2020 05:46)  T(F): 99.8 (05 Feb 2020 05:46), Max: 99.8 (05 Feb 2020 05:46)  HR: 72 (05 Feb 2020 05:46) (70 - 83)  BP: 129/90 (05 Feb 2020 05:46) (116/79 - 133/86)  BP(mean): --  RR: 16 (05 Feb 2020 05:46) (14 - 17)  SpO2: 97% (05 Feb 2020 05:46) (95% - 99%)    Well appearing, NAD  Breathing comfortably on RA, no stridor, no stertor  NC clear anteriorly, no epistaxis  OC mucosa pink and moist,   Neck with horizontal incision, c/d/i, steristrips in place, mild barbie-incisional edema, no hematoma, no crepitus, no skin changes, tender to palpation, bl neck JPs with milky output (r>L), R intermittently holding suction   R ALONZO 52.5  L ALONZO 37.5  Chvostek positive this am b/l    PTH 4.4  cCa 7.8,7.5,7.2  Alb 3.7  Triglycerides, Fluid: 37: NO REFERENCE RANGE AVAILABLE. mg/dL (02.03.20 @ 10:15)      A/P: 53yo M with PMH thyroid cancer s/p total thyroidectomy, bl SND, central neck dissection 1/31, c/b chyle leak.   - q6h calciums until stable  - CLD  - continue calcium 3750QID, rocal .5BID  - levothyroxine  - pain control  - bowel regimen  - OOB  - SQH for DVT ppx

## 2020-02-05 NOTE — PROGRESS NOTE ADULT - SUBJECTIVE AND OBJECTIVE BOX
Follow-up on: Post op hypothyroidism and hypocalcemia.    Subjective:    MEDICATIONS  (STANDING):  calcitriol   Capsule 0.5 MICROGram(s) Oral two times a day  calcium carbonate   1250 mG (OsCal) 3 Tablet(s) Oral four times a day  heparin  Injectable 5000 Unit(s) SubCutaneous every 12 hours  hydrochlorothiazide 25 milliGRAM(s) Oral daily  levothyroxine 150 MICROGram(s) Oral <User Schedule>  lisinopril 20 milliGRAM(s) Oral daily  octreotide  Injectable 100 MICROGram(s) SubCutaneous every 8 hours  polyethylene glycol 3350 17 Gram(s) Oral daily  senna 2 Tablet(s) Oral at bedtime    MEDICATIONS  (PRN):  acetaminophen   Tablet .. 650 milliGRAM(s) Oral every 6 hours PRN Mild Pain (1 - 3)  guaiFENesin  milliGRAM(s) Oral every 12 hours PRN congestion  melatonin 3 milliGRAM(s) Oral at bedtime PRN Insomnia  oxyCODONE    IR 5 milliGRAM(s) Oral every 6 hours PRN Moderate Pain (4 - 6)  oxyCODONE    IR 10 milliGRAM(s) Oral every 6 hours PRN Severe Pain (7 - 10)      PHYSICAL EXAM:  VITALS: T(C): 36.8 (02-05-20 @ 14:00)  T(F): 98.3 (02-05-20 @ 14:00), Max: 101.1 (02-05-20 @ 09:41)  HR: 70 (02-05-20 @ 14:00) (70 - 86)  BP: 110/70 (02-05-20 @ 14:00) (110/70 - 129/90)  RR:  (16 - 18)  SpO2:  (94% - 98%)  Wt(kg): --  GENERAL: NAD, well-groomed, well-developed  RESPIRATORY: Clear to auscultation bilaterally; No rales, rhonchi, wheezing, or rubs  CARDIOVASCULAR: Regular rate and rhythm; No murmurs; no peripheral edema  GI: Soft, nontender, non distended, normal bowel sounds      02-05    136  |  97<L>  |  19  ----------------------------<  114<H>  4.0   |  26  |  1.04    EGFR if : 93  EGFR if non : 80    Ca    6.8<L>      02-05  Mg     1.8     02-05  Phos  5.0     02-05 Follow-up on: Post op hypothyroidism and hypocalcemia.    Subjective: Patient seen at bedside, says he is doing well. Currently does not report any numbness or tingling. Otherwise feeling fine.     MEDICATIONS  (STANDING):  calcitriol   Capsule 0.5 MICROGram(s) Oral two times a day  calcium carbonate   1250 mG (OsCal) 3 Tablet(s) Oral four times a day  heparin  Injectable 5000 Unit(s) SubCutaneous every 12 hours  hydrochlorothiazide 25 milliGRAM(s) Oral daily  levothyroxine 150 MICROGram(s) Oral <User Schedule>  lisinopril 20 milliGRAM(s) Oral daily  octreotide  Injectable 100 MICROGram(s) SubCutaneous every 8 hours  polyethylene glycol 3350 17 Gram(s) Oral daily  senna 2 Tablet(s) Oral at bedtime    MEDICATIONS  (PRN):  acetaminophen   Tablet .. 650 milliGRAM(s) Oral every 6 hours PRN Mild Pain (1 - 3)  guaiFENesin  milliGRAM(s) Oral every 12 hours PRN congestion  melatonin 3 milliGRAM(s) Oral at bedtime PRN Insomnia  oxyCODONE    IR 5 milliGRAM(s) Oral every 6 hours PRN Moderate Pain (4 - 6)  oxyCODONE    IR 10 milliGRAM(s) Oral every 6 hours PRN Severe Pain (7 - 10)      PHYSICAL EXAM:  VITALS: T(C): 36.8 (02-05-20 @ 14:00)  T(F): 98.3 (02-05-20 @ 14:00), Max: 101.1 (02-05-20 @ 09:41)  HR: 70 (02-05-20 @ 14:00) (70 - 86)  BP: 110/70 (02-05-20 @ 14:00) (110/70 - 129/90)  RR:  (16 - 18)  SpO2:  (94% - 98%)  Wt(kg): --  GENERAL: NAD, well-groomed, well-developed  RESPIRATORY: Clear to auscultation bilaterally; No rales, rhonchi, wheezing, or rubs  CARDIOVASCULAR: Regular rate and rhythm; No murmurs; no peripheral edema  GI: Soft, nontender, non distended, normal bowel sounds      02-05    136  |  97<L>  |  19  ----------------------------<  114<H>  4.0   |  26  |  1.04    EGFR if : 93  EGFR if non : 80    Ca    6.8<L>      02-05  Mg     1.8     02-05  Phos  5.0     02-05

## 2020-02-05 NOTE — PROVIDER CONTACT NOTE (OTHER) - ACTION/TREATMENT ORDERED:
Sacha MARTIN made aware; verbally ordered to administer 650mg of acetaminophen and continue to monitor. Will continue to monitor.

## 2020-02-06 PROBLEM — I10 ESSENTIAL (PRIMARY) HYPERTENSION: Chronic | Status: ACTIVE | Noted: 2020-01-31

## 2020-02-06 LAB
24R-OH-CALCIDIOL SERPL-MCNC: 16.8 NG/ML — LOW (ref 30–80)
ANION GAP SERPL CALC-SCNC: 14 MMO/L — SIGNIFICANT CHANGE UP (ref 7–14)
APPEARANCE UR: CLEAR — SIGNIFICANT CHANGE UP
BILIRUB UR-MCNC: NEGATIVE — SIGNIFICANT CHANGE UP
BLOOD UR QL VISUAL: NEGATIVE — SIGNIFICANT CHANGE UP
BUN SERPL-MCNC: 20 MG/DL — SIGNIFICANT CHANGE UP (ref 7–23)
CALCIUM SERPL-MCNC: 6.7 MG/DL — LOW (ref 8.4–10.5)
CALCIUM SERPL-MCNC: 6.7 MG/DL — LOW (ref 8.4–10.5)
CALCIUM SERPL-MCNC: 6.8 MG/DL — LOW (ref 8.4–10.5)
CALCIUM SERPL-MCNC: 6.8 MG/DL — LOW (ref 8.4–10.5)
CHLORIDE SERPL-SCNC: 95 MMOL/L — LOW (ref 98–107)
CO2 SERPL-SCNC: 26 MMOL/L — SIGNIFICANT CHANGE UP (ref 22–31)
COLOR SPEC: YELLOW — SIGNIFICANT CHANGE UP
CREAT SERPL-MCNC: 1.16 MG/DL — SIGNIFICANT CHANGE UP (ref 0.5–1.3)
GLUCOSE SERPL-MCNC: 111 MG/DL — HIGH (ref 70–99)
GLUCOSE UR-MCNC: NEGATIVE — SIGNIFICANT CHANGE UP
KETONES UR-MCNC: NEGATIVE — SIGNIFICANT CHANGE UP
LEUKOCYTE ESTERASE UR-ACNC: NEGATIVE — SIGNIFICANT CHANGE UP
MAGNESIUM SERPL-MCNC: 1.7 MG/DL — SIGNIFICANT CHANGE UP (ref 1.6–2.6)
NITRITE UR-MCNC: NEGATIVE — SIGNIFICANT CHANGE UP
PH UR: 6 — SIGNIFICANT CHANGE UP (ref 5–8)
PHOSPHATE SERPL-MCNC: 5.3 MG/DL — HIGH (ref 2.5–4.5)
POTASSIUM SERPL-MCNC: 4.4 MMOL/L — SIGNIFICANT CHANGE UP (ref 3.5–5.3)
POTASSIUM SERPL-SCNC: 4.4 MMOL/L — SIGNIFICANT CHANGE UP (ref 3.5–5.3)
PROT UR-MCNC: NEGATIVE — SIGNIFICANT CHANGE UP
PTH-INTACT SERPL-MCNC: 8.02 PG/ML — LOW (ref 15–65)
SODIUM SERPL-SCNC: 135 MMOL/L — SIGNIFICANT CHANGE UP (ref 135–145)
SP GR SPEC: 1.02 — SIGNIFICANT CHANGE UP (ref 1–1.04)
UROBILINOGEN FLD QL: NORMAL — SIGNIFICANT CHANGE UP
VIT D25+D1,25 OH+D1,25 PNL SERPL-MCNC: 29.1 PG/ML — SIGNIFICANT CHANGE UP (ref 19.9–79.3)

## 2020-02-06 PROCEDURE — 99233 SBSQ HOSP IP/OBS HIGH 50: CPT | Mod: GC

## 2020-02-06 PROCEDURE — 71045 X-RAY EXAM CHEST 1 VIEW: CPT | Mod: 26

## 2020-02-06 PROCEDURE — 99223 1ST HOSP IP/OBS HIGH 75: CPT

## 2020-02-06 RX ORDER — ERGOCALCIFEROL 1.25 MG/1
50000 CAPSULE ORAL
Refills: 0 | Status: DISCONTINUED | OUTPATIENT
Start: 2020-02-06 | End: 2020-02-10

## 2020-02-06 RX ORDER — CALCIUM CARBONATE 500(1250)
4 TABLET ORAL
Refills: 0 | Status: DISCONTINUED | OUTPATIENT
Start: 2020-02-06 | End: 2020-02-10

## 2020-02-06 RX ORDER — MAGNESIUM OXIDE 400 MG ORAL TABLET 241.3 MG
400 TABLET ORAL
Refills: 0 | Status: DISCONTINUED | OUTPATIENT
Start: 2020-02-06 | End: 2020-02-10

## 2020-02-06 RX ADMIN — Medication 650 MILLIGRAM(S): at 13:26

## 2020-02-06 RX ADMIN — Medication 650 MILLIGRAM(S): at 05:53

## 2020-02-06 RX ADMIN — Medication 650 MILLIGRAM(S): at 19:18

## 2020-02-06 RX ADMIN — Medication 25 MILLIGRAM(S): at 05:53

## 2020-02-06 RX ADMIN — Medication 3 TABLET(S): at 07:58

## 2020-02-06 RX ADMIN — CALCITRIOL 0.5 MICROGRAM(S): 0.5 CAPSULE ORAL at 07:42

## 2020-02-06 RX ADMIN — Medication 3 MILLIGRAM(S): at 21:53

## 2020-02-06 RX ADMIN — Medication 3 TABLET(S): at 12:56

## 2020-02-06 RX ADMIN — Medication 650 MILLIGRAM(S): at 06:23

## 2020-02-06 RX ADMIN — OCTREOTIDE ACETATE 100 MICROGRAM(S): 200 INJECTION, SOLUTION INTRAVENOUS; SUBCUTANEOUS at 13:51

## 2020-02-06 RX ADMIN — HEPARIN SODIUM 5000 UNIT(S): 5000 INJECTION INTRAVENOUS; SUBCUTANEOUS at 18:33

## 2020-02-06 RX ADMIN — OCTREOTIDE ACETATE 100 MICROGRAM(S): 200 INJECTION, SOLUTION INTRAVENOUS; SUBCUTANEOUS at 21:53

## 2020-02-06 RX ADMIN — Medication 650 MILLIGRAM(S): at 20:06

## 2020-02-06 RX ADMIN — Medication 4 TABLET(S): at 18:28

## 2020-02-06 RX ADMIN — OCTREOTIDE ACETATE 100 MICROGRAM(S): 200 INJECTION, SOLUTION INTRAVENOUS; SUBCUTANEOUS at 05:53

## 2020-02-06 RX ADMIN — Medication 150 MICROGRAM(S): at 05:07

## 2020-02-06 RX ADMIN — Medication 4 TABLET(S): at 21:53

## 2020-02-06 RX ADMIN — LISINOPRIL 20 MILLIGRAM(S): 2.5 TABLET ORAL at 05:53

## 2020-02-06 RX ADMIN — Medication 650 MILLIGRAM(S): at 12:56

## 2020-02-06 RX ADMIN — HEPARIN SODIUM 5000 UNIT(S): 5000 INJECTION INTRAVENOUS; SUBCUTANEOUS at 05:52

## 2020-02-06 RX ADMIN — CALCITRIOL 0.5 MICROGRAM(S): 0.5 CAPSULE ORAL at 18:28

## 2020-02-06 RX ADMIN — MAGNESIUM OXIDE 400 MG ORAL TABLET 400 MILLIGRAM(S): 241.3 TABLET ORAL at 18:27

## 2020-02-06 NOTE — PROGRESS NOTE ADULT - PROBLEM SELECTOR PLAN 3
- s/p Total Thyroidectomy with modified neck dissection levels II, III, and IV on 1/31/20 for 6 m PTC with LN involvement.  - Path result noted.  - Continue with levothyroxine 150 mcg qd, as mentioned above.  Patient can follow up with [facility]  [address] [place] [address], [address], [place] [address]  [phone]. - s/p Total Thyroidectomy with modified neck dissection levels II, III, and IV on 1/31/20 for 6 m PTC with LN involvement.  - Path result noted. Will consider KATE treatment as outpatient.   - Continue with levothyroxine 150 mcg qd, as mentioned above.  Patient to follow up with [facility]  Dr. Kylee Reinoso DO, [address] [place] [address].   Appointment scheduled for 2/24/20 at 11 am.   [phone].

## 2020-02-06 NOTE — PROGRESS NOTE ADULT - PROBLEM SELECTOR PLAN 1
- Secondary to post op hypoparathyroidism, intact PTH post op 4.49.   - Calcium level persistently low, most recent noted to be 6.8; goal is 8-8.5   - Patient is currently not symptomatic   - Vitamin D 25 OH noted to be low 16.8, recommend Ergocalciferol 50,000 units weekly for at least 8 weeks. Recheck levels then.   - Continue with calcium carbonate: 1250 mg -->3 tabs 4 times a day (with meals and at bedtime). Spoke to RN yesterday to make sure its given with meals and apart from LT4  - Continue calcitriol to 0.5 mcg bid, would avoid increase as phos level has been elevated, if persistently high would decrease further down to 0.5 mcg qd.  - Follow vitamin D 1,25-di OH levels   - Monitor serum calcium q8h along with daily albumin, Mg and phos  - Mg today noted to be 1.7. If low can consider Mg oxide 400 mg tid with meals  - Can give prn calcium gluconate if serum Ca <7 or patient is symptomatic.  - Outpatient endocrine follow up on discharge. - Secondary to post op hypoparathyroidism, intact PTH post op 4.49.   - Calcium level persistently low, most recent noted to be 6.8; goal is 8-8.5   - Patient is currently not symptomatic   - Vitamin D 25 OH noted to be low 16.8, recommend Ergocalciferol 50,000 units weekly for at least 8 weeks. Recheck levels then.   - Recommend to increase calcium carbonate to 1250 mg -->4 tabs 4 times a day (with meals and at bedtime). Spoke to RN yesterday to make sure its given with meals and apart from LT4  - Continue calcitriol to 0.5 mcg bid, would avoid increase as phos level has been elevated, if persistently high would decrease further down to 0.5 mcg qd.  - Follow vitamin D 1,25-di OH levels   - Monitor serum calcium q8h along with daily albumin, Mg and phos  - Mg today noted to be 1.7. If low can consider Mg oxide 400 mg tid with meals  - Can give prn calcium gluconate if serum Ca <7 or patient is symptomatic.  - Outpatient endocrine follow up on discharge.

## 2020-02-06 NOTE — PROGRESS NOTE ADULT - ASSESSMENT
54 yr old male recently diagnosed with papillary thyroid cancer underwent Total Thyroidectomy with modified neck dissection levels II, III, and IV on 1/31/20.  Endocrine team following for post op hypocalcemia and post op hypothyroidism.

## 2020-02-06 NOTE — PROGRESS NOTE ADULT - SUBJECTIVE AND OBJECTIVE BOX
Patient seen and examined at bedside.   No acute events overnight.   reg diet  still hypocalcemic    Vital Signs Last 24 Hrs  T(C): 37.4 (06 Feb 2020 10:19), Max: 38.3 (05 Feb 2020 22:00)  T(F): 99.3 (06 Feb 2020 10:19), Max: 100.9 (05 Feb 2020 22:00)  HR: 72 (06 Feb 2020 10:19) (69 - 79)  BP: 105/61 (06 Feb 2020 10:19) (105/61 - 126/78)  BP(mean): --  RR: 17 (06 Feb 2020 10:19) (16 - 18)  SpO2: 98% (06 Feb 2020 10:19) (95% - 98%)    Well appearing, NAD  Breathing comfortably on RA, no stridor, no stertor  NC clear anteriorly, no epistaxis  OC mucosa pink and moist,   Neck with horizontal incision, c/d/i, steristrips in place, mild barbie-incisional edema, no hematoma, no crepitus, no skin changes, tender to palpation    PTH 4.4  cCa 6.8<7.1<6.9  Alb 3.7  Triglycerides, Fluid: 37: NO REFERENCE RANGE AVAILABLE. mg/dL (02.03.20 @ 10:15)      A/P: 55yo M with PMH thyroid cancer s/p total thyroidectomy, bl SND, central neck dissection 1/31, c/b chyle leak.   - q6h calciums until stable  - CLD  - continue calcium 3750QID, rocal .5BID  - levothyroxine  - pain control  - bowel regimen  - OOB  - SQH for DVT ppx

## 2020-02-06 NOTE — PROGRESS NOTE ADULT - PROBLEM SELECTOR PLAN 2
- Continue with levothyroxine 150 mcg qd, to be given in on empty stomach, wait at least half an hr before eating or taking other meds and should be 4 hrs apart from calcium or MVI (Noted to be given along with Ca today at 6 am, spoke to RN and reinforced to be given LT4 at 5 am and Ca with meals)  - Check TFTs as outpatient in 4 weeks   - Goal TSH <0.1 - Continue with levothyroxine 150 mcg qd, to be given in on empty stomach, wait at least half an hr before eating or taking other meds and should be 4 hrs apart from calcium or MVI (Noted to be given along with Ca today at 6 am, spoke to RN and reinforced to be given LT4 at 5 am and Ca with meals)  - Check TFTs as outpatient in 4 weeks   - Goal TSH <0.1 based on path result

## 2020-02-06 NOTE — PROGRESS NOTE ADULT - SUBJECTIVE AND OBJECTIVE BOX
Follow-up on: Hypocalcemia, post op    Subjective:     MEDICATIONS  (STANDING):  calcitriol   Capsule 0.5 MICROGram(s) Oral two times a day  calcium carbonate   1250 mG (OsCal) 3 Tablet(s) Oral <User Schedule>  ergocalciferol 99147 Unit(s) Oral every week  heparin  Injectable 5000 Unit(s) SubCutaneous every 12 hours  hydrochlorothiazide 25 milliGRAM(s) Oral daily  levothyroxine 150 MICROGram(s) Oral <User Schedule>  lisinopril 20 milliGRAM(s) Oral daily  octreotide  Injectable 100 MICROGram(s) SubCutaneous every 8 hours  polyethylene glycol 3350 17 Gram(s) Oral daily  senna 2 Tablet(s) Oral at bedtime    MEDICATIONS  (PRN):  acetaminophen   Tablet .. 650 milliGRAM(s) Oral every 6 hours PRN Mild Pain (1 - 3)  guaiFENesin  milliGRAM(s) Oral every 12 hours PRN congestion  melatonin 3 milliGRAM(s) Oral at bedtime PRN Insomnia  oxyCODONE    IR 5 milliGRAM(s) Oral every 6 hours PRN Moderate Pain (4 - 6)  oxyCODONE    IR 10 milliGRAM(s) Oral every 6 hours PRN Severe Pain (7 - 10)      PHYSICAL EXAM:  VITALS: T(C): 37.4 (02-06-20 @ 10:19)  T(F): 99.3 (02-06-20 @ 10:19), Max: 100.9 (02-05-20 @ 22:00)  HR: 72 (02-06-20 @ 10:19) (69 - 79)  BP: 105/61 (02-06-20 @ 10:19) (105/61 - 126/78)  RR:  (16 - 18)  SpO2:  (95% - 98%)  Wt(kg): --  GENERAL: NAD, well-groomed, well-developed  RESPIRATORY: Clear to auscultation bilaterally; No rales, rhonchi, wheezing, or rubs  CARDIOVASCULAR: Regular rate and rhythm; No murmurs; no peripheral edema  GI: Soft, nontender, non distended, normal bowel sounds  Neuro: AAO x 3      02-06    135  |  95<L>  |  20  ----------------------------<  111<H>  4.4   |  26  |  1.16    EGFR if : 81  EGFR if non : 70    Ca    6.8<L>      02-06  Mg     1.7     02-06  Phos  5.3     02-06    TPro  x   /  Alb  3.9  /  TBili  x   /  DBili  x   /  AST  x   /  ALT  x   /  AlkPhos  x   02-05 Follow-up on: Hypocalcemia, post op    Subjective:     MEDICATIONS  (STANDING):  calcitriol   Capsule 0.5 MICROGram(s) Oral two times a day  calcium carbonate   1250 mG (OsCal) 3 Tablet(s) Oral <User Schedule>  ergocalciferol 15787 Unit(s) Oral every week  heparin  Injectable 5000 Unit(s) SubCutaneous every 12 hours  hydrochlorothiazide 25 milliGRAM(s) Oral daily  levothyroxine 150 MICROGram(s) Oral <User Schedule>  lisinopril 20 milliGRAM(s) Oral daily  octreotide  Injectable 100 MICROGram(s) SubCutaneous every 8 hours  polyethylene glycol 3350 17 Gram(s) Oral daily  senna 2 Tablet(s) Oral at bedtime    MEDICATIONS  (PRN):  acetaminophen   Tablet .. 650 milliGRAM(s) Oral every 6 hours PRN Mild Pain (1 - 3)  guaiFENesin  milliGRAM(s) Oral every 12 hours PRN congestion  melatonin 3 milliGRAM(s) Oral at bedtime PRN Insomnia  oxyCODONE    IR 5 milliGRAM(s) Oral every 6 hours PRN Moderate Pain (4 - 6)  oxyCODONE    IR 10 milliGRAM(s) Oral every 6 hours PRN Severe Pain (7 - 10)      PHYSICAL EXAM:  VITALS: T(C): 37.4 (02-06-20 @ 10:19)  T(F): 99.3 (02-06-20 @ 10:19), Max: 100.9 (02-05-20 @ 22:00)  HR: 72 (02-06-20 @ 10:19) (69 - 79)  BP: 105/61 (02-06-20 @ 10:19) (105/61 - 126/78)  RR:  (16 - 18)  SpO2:  (95% - 98%)  Wt(kg): --  GENERAL: NAD, well-groomed, well-developed  RESPIRATORY: Clear to auscultation bilaterally; No rales, rhonchi, wheezing, or rubs  CARDIOVASCULAR: Regular rate and rhythm; No murmurs; no peripheral edema  GI: Soft, nontender, non distended, normal bowel sounds  Neuro: AAO x 3      02-06    135  |  95<L>  |  20  ----------------------------<  111<H>  4.4   |  26  |  1.16    EGFR if : 81  EGFR if non : 70    Ca    6.8<L>      02-06  Mg     1.7     02-06  Phos  5.3     02-06    TPro  x   /  Alb  3.9  /  TBili  x   /  DBili  x   /  AST  x   /  ALT  x   /  AlkPhos  x   02-05        Surgical Pathology Report (01.31.20 @ 16:30)    Surgical Pathology Report:     Final Diagnosis    1. Muscle, right strap muscle, excision  - Skeletal muscle, negative for carcinoma  - 1 lymph node negative for metastatic carcinoma    2. Lymph node, right level VI, excision  - 1 lymph node positive for metastatic carcinoma    3. Thyroid, "right hemithyroid with right neck contents levels 2,  3 and 4, including jugular vein", lobectomy and neck dissection  - Papillary thyroid carcinoma, see synoptic summary  - Papillary thyroid microcarcinoma (0.1 cm, 0.3 cm)  - 3 lymph nodes positive for metastatic carcinoma in  posterior thyroid  - Level 2: 7 lymph nodes negative for metastatic carcinoma  - Level 3: 1 out of 7lymph nodes positive for metastatic  carcinoma  - Level 4: 1 out of 4 lymph nodes positive for metastatic  carcinoma  - 3 lymph nodes negative for metastatic carcinoma in strap  muscle    4. Thyroid, left lobe of thyroid, lobectomy  - Thyroid tissue with tiny adenomatoid nodules,  negative  for carcinoma    5. Lymph nodes, level VI, neck dissection  - 4 out of 7 lymph nodes positive for metastatic carcinoma  - Parathyroid tissue    6. Lymph nodes, additional right level II, neck dissection  - 1 out of 5 lymph nodes positive for metastatic carcinoma    7. Lymph nodes, left neck contents level 2, 3 and 4, neck  dissection  - 2 out of 26 lymph nodes positive for metastatic carcinoma    Synoptic Summary Thyroid    Procedure:  Right lobectomy  Tumor Focality: Multifocal  Tumor Site: Right lobe  Tumor Size: 6.9 x 3.5 x 1.8 cm  Histologic Type:  Papillary thyroid carcinoma, classical variant,  focal tall cell morphology  Margins: Uninvolved by carcinoma  Distance of tumor from closest margin (millimeters): 0.1 mm from  circumferential margin  Angioinvasion (Vascular Invasion): Not identified  Lymphatic Invasion:  Not identified  Perineural Invasion:  Not identified  Extrathyroidal Extension: Not identified    Regional Lymph Nodes  Lymph Node Examination (required only if lymph nodes present in  specimen)  Number of Lymph Nodes Involved: 13  Level VI - pretracheal, paratracheal and prelaryngeal/Delphian,  prethyroidal (central compartment dissection)  Level II-IV (lateral neck dissection)  +  Right  +  Left  Number of Lymph Nodes Examined: 64  Level VI - pretracheal, paratracheal and prelaryngeal/Delphian,  prethyroidal (central compartment dissection)  Level II-IV (lateral neck dissection)  +  Right  +  Left  Lymph Node Metastasis (required only if lymph nodes involved)  Size of Largest Metastatic Deposit (centimeters): 3.1 cm  Extranodal Extension (WILMAR): Not identified    Pathologic Staging (pTNM):  TNM Descriptors:  m  Primary Tumor (pT):  pT3b  Regional Lymph Nodes (pN): pN1b  Distant Metastasis (pM):  pMX    Additional Pathologic Findings: Papillary thyroid microcarcinoma  (0.1 cm, 0.3 cm) in right lobe  Ancillary Studies:  None Follow-up on: Hypocalcemia, post op    Subjective: Feeling well, tolerating po, denies complaints.  No paresthesias.    MEDICATIONS  (STANDING):  calcitriol   Capsule 0.5 MICROGram(s) Oral two times a day  calcium carbonate   1250 mG (OsCal) 3 Tablet(s) Oral <User Schedule>  ergocalciferol 90015 Unit(s) Oral every week  heparin  Injectable 5000 Unit(s) SubCutaneous every 12 hours  hydrochlorothiazide 25 milliGRAM(s) Oral daily  levothyroxine 150 MICROGram(s) Oral <User Schedule>  lisinopril 20 milliGRAM(s) Oral daily  octreotide  Injectable 100 MICROGram(s) SubCutaneous every 8 hours  polyethylene glycol 3350 17 Gram(s) Oral daily  senna 2 Tablet(s) Oral at bedtime    MEDICATIONS  (PRN):  acetaminophen   Tablet .. 650 milliGRAM(s) Oral every 6 hours PRN Mild Pain (1 - 3)  guaiFENesin  milliGRAM(s) Oral every 12 hours PRN congestion  melatonin 3 milliGRAM(s) Oral at bedtime PRN Insomnia  oxyCODONE    IR 5 milliGRAM(s) Oral every 6 hours PRN Moderate Pain (4 - 6)  oxyCODONE    IR 10 milliGRAM(s) Oral every 6 hours PRN Severe Pain (7 - 10)      PHYSICAL EXAM:  VITALS: T(C): 37.4 (02-06-20 @ 10:19)  T(F): 99.3 (02-06-20 @ 10:19), Max: 100.9 (02-05-20 @ 22:00)  HR: 72 (02-06-20 @ 10:19) (69 - 79)  BP: 105/61 (02-06-20 @ 10:19) (105/61 - 126/78)  RR:  (16 - 18)  SpO2:  (95% - 98%)  Wt(kg): --  GENERAL: NAD, well-groomed, well-developed  RESPIRATORY: Clear to auscultation bilaterally; No rales, rhonchi, wheezing, or rubs  CARDIOVASCULAR: Regular rate and rhythm; No murmurs; no peripheral edema  GI: Soft, nontender, non distended, normal bowel sounds  Neuro: AAO x 3      02-06    135  |  95<L>  |  20  ----------------------------<  111<H>  4.4   |  26  |  1.16    EGFR if : 81  EGFR if non : 70    Ca    6.8<L>      02-06  Mg     1.7     02-06  Phos  5.3     02-06    TPro  x   /  Alb  3.9  /  TBili  x   /  DBili  x   /  AST  x   /  ALT  x   /  AlkPhos  x   02-05        Surgical Pathology Report (01.31.20 @ 16:30)    Surgical Pathology Report:     Final Diagnosis    1. Muscle, right strap muscle, excision  - Skeletal muscle, negative for carcinoma  - 1 lymph node negative for metastatic carcinoma    2. Lymph node, right level VI, excision  - 1 lymph node positive for metastatic carcinoma    3. Thyroid, "right hemithyroid with right neck contents levels 2,  3 and 4, including jugular vein", lobectomy and neck dissection  - Papillary thyroid carcinoma, see synoptic summary  - Papillary thyroid microcarcinoma (0.1 cm, 0.3 cm)  - 3 lymph nodes positive for metastatic carcinoma in  posterior thyroid  - Level 2: 7 lymph nodes negative for metastatic carcinoma  - Level 3: 1 out of 7lymph nodes positive for metastatic  carcinoma  - Level 4: 1 out of 4 lymph nodes positive for metastatic  carcinoma  - 3 lymph nodes negative for metastatic carcinoma in strap  muscle    4. Thyroid, left lobe of thyroid, lobectomy  - Thyroid tissue with tiny adenomatoid nodules,  negative  for carcinoma    5. Lymph nodes, level VI, neck dissection  - 4 out of 7 lymph nodes positive for metastatic carcinoma  - Parathyroid tissue    6. Lymph nodes, additional right level II, neck dissection  - 1 out of 5 lymph nodes positive for metastatic carcinoma    7. Lymph nodes, left neck contents level 2, 3 and 4, neck  dissection  - 2 out of 26 lymph nodes positive for metastatic carcinoma    Synoptic Summary Thyroid    Procedure:  Right lobectomy  Tumor Focality: Multifocal  Tumor Site: Right lobe  Tumor Size: 6.9 x 3.5 x 1.8 cm  Histologic Type:  Papillary thyroid carcinoma, classical variant,  focal tall cell morphology  Margins: Uninvolved by carcinoma  Distance of tumor from closest margin (millimeters): 0.1 mm from  circumferential margin  Angioinvasion (Vascular Invasion): Not identified  Lymphatic Invasion:  Not identified  Perineural Invasion:  Not identified  Extrathyroidal Extension: Not identified    Regional Lymph Nodes  Lymph Node Examination (required only if lymph nodes present in  specimen)  Number of Lymph Nodes Involved: 13  Level VI - pretracheal, paratracheal and prelaryngeal/Delphian,  prethyroidal (central compartment dissection)  Level II-IV (lateral neck dissection)  +  Right  +  Left  Number of Lymph Nodes Examined: 64  Level VI - pretracheal, paratracheal and prelaryngeal/Delphian,  prethyroidal (central compartment dissection)  Level II-IV (lateral neck dissection)  +  Right  +  Left  Lymph Node Metastasis (required only if lymph nodes involved)  Size of Largest Metastatic Deposit (centimeters): 3.1 cm  Extranodal Extension (WILMAR): Not identified    Pathologic Staging (pTNM):  TNM Descriptors:  m  Primary Tumor (pT):  pT3b  Regional Lymph Nodes (pN): pN1b  Distant Metastasis (pM):  pMX    Additional Pathologic Findings: Papillary thyroid microcarcinoma  (0.1 cm, 0.3 cm) in right lobe  Ancillary Studies:  None

## 2020-02-06 NOTE — CONSULT NOTE ADULT - SUBJECTIVE AND OBJECTIVE BOX
Patient is a 54y old  Male who presents with a chief complaint of Total thyroidectomy (2020 15:09)      HPI:  Pt is a 54 yr old male scheduled for Total Thyroidectomy with Level VI and Bilateral Level II-IV Neck Dissections Parathyroid Reimplantations with Dr Boykin 20. Pt states he noted swelling right neck 2 yrs ago but was treated wiht antibiotics and swelling resolved. Pt again noted swelling 2 months ago and had biopsy that was positive for CA - pt c/o of increased size over past month. Pt denies dysphagia or pain. (2020 07:10)    Patient with recently diagnosed papillary thyroid cancer s/p total thyroidectomy, with neck dissection by ENT on , course c/b continued hypocalcemia. Being followed by endocrine, on calcium supplements and thyroid hormone replacement. No further surgical intervention planned per ENT. Goal Ca is 8 -8.5 for discharge, today's calcium is 6.8.     Patient currently feeling well, denies numbness/tingling/weakness. Thinks the calcium supplements are large and somewhat difficult to swallow but so far tolerating them. Reports feeling intermittently hot or chilly, some non-productive cough but no SOB. Denies recent sick contacts (family members at bedside also deny recent illnesses). Thinks he snores at night, but denies dry mouth/headache in the AM, denies falling asleep suddenly while sitting, feels refreshed in the morning.      Allergies  No Known Allergies    Intolerances    HOME MEDICATIONS: Reviewed    MEDICATIONS  (STANDING):  calcitriol   Capsule 0.5 MICROGram(s) Oral two times a day  calcium carbonate   1250 mG (OsCal) 4 Tablet(s) Oral <User Schedule>  ergocalciferol 79481 Unit(s) Oral every week  heparin  Injectable 5000 Unit(s) SubCutaneous every 12 hours  hydrochlorothiazide 25 milliGRAM(s) Oral daily  levothyroxine 150 MICROGram(s) Oral <User Schedule>  lisinopril 20 milliGRAM(s) Oral daily  magnesium oxide 400 milliGRAM(s) Oral three times a day with meals  octreotide  Injectable 100 MICROGram(s) SubCutaneous every 8 hours  polyethylene glycol 3350 17 Gram(s) Oral daily  senna 2 Tablet(s) Oral at bedtime    MEDICATIONS  (PRN):  acetaminophen   Tablet .. 650 milliGRAM(s) Oral every 6 hours PRN Mild Pain (1 - 3)  guaiFENesin  milliGRAM(s) Oral every 12 hours PRN congestion  melatonin 3 milliGRAM(s) Oral at bedtime PRN Insomnia  oxyCODONE    IR 5 milliGRAM(s) Oral every 6 hours PRN Moderate Pain (4 - 6)  oxyCODONE    IR 10 milliGRAM(s) Oral every 6 hours PRN Severe Pain (7 - 10)      PAST MEDICAL & SURGICAL HISTORY:  Hypertension  Obesity  Papillary Thyroid ca  History of ankle surgery: Left ankle fracture - hardware placed    SOCIAL HISTORY:  , lives with spouse, works at [employer], social etoh use, denies smoking    FAMILY HISTORY:  No pertinent family history - no hx thyroid disorders    REVIEW OF SYSTEMS:  CONSTITUTIONAL: intermittent fever/chills, good appetite  EYES: No eye pain or blurry vision  ENMT:  No difficulty hearing, no trouble drinking/swallowing  NECK: No pain or stiffness  RESPIRATORY: intermittent coughing, denies shortness of breath  CARDIOVASCULAR: No chest pain, palpitations, or leg swelling  GASTROINTESTINAL: No abdominal pain. No nausea, vomiting  GENITOURINARY: No dysuria, or incontinence  NEUROLOGICAL: No headaches, focal loss of strength, weakness or numbness  SKIN: surgical site on neck with pain, redness or drainage  ENDOCRINE: On thyroid hormone replacement, hx papillary thyroid cancer s/p thyroidecomy  MUSCULOSKELETAL: No muscle or back pain  PSYCHIATRIC: No issues, some trouble sleeping in the hospital  HEME/LYMPH: No easy bleeding or bruising  ALLERGY AND IMMUNOLOGIC: NKDA    Vital Signs Last 24 Hrs  T(C): 37.2 (2020 14:16), Max: 38.3 (2020 22:00)  T(F): 99 (2020 14:16), Max: 100.9 (2020 22:00)  HR: 80 (2020 14:16) (69 - 80)  BP: 103/67 (2020 14:16) (103/67 - 126/78)  BP(mean): --  RR: 16 (2020 14:16) (16 - 17)  SpO2: 99% (2020 14:16) (95% - 99%)  CAPILLARY BLOOD GLUCOSE      PHYSICAL EXAM:  GENERAL: NAD, sitting in chair, family members at bedside  HEAD:  Atraumatic, Normocephalic  EYES: EOMI, conjunctiva and sclera clear  ENMT: Moist mucous membranes  NECK: Supple, No JVD, neck horizontal incision c/d/i, no erythema or drainage, non-tender  RESPIRATORY: Clear to auscultation bilaterally; No rales, rhonchi, wheezing, or rubs. Comfortable on RA, speaking in full sentences  CARDIOVASCULAR: S1S2 Regular rate and rhythm; No murmurs, rubs, or gallops  GASTROINTESTINAL: Soft, Nontender, Nondistended; Bowel sounds present  BACK: Non-tender  GENITOURINARY: Not examined  EXTREMITIES: No clubbing, cyanosis, or edema, wwp  NERVOUS SYSTEM:  Alert & Oriented X3; Moving all 4 extremities; No gross sensory deficits, non-focal, no tremors  PSYCH: Calm, pleasant  SKIN:  Incisions C/D/I as above    LABS:                        13.4   6.13  )-----------( 270      ( 2020 12:02 )             39.0     02-06    135  |  95<L>  |  20  ----------------------------<  111<H>  4.4   |  26  |  1.16    Ca    6.8<L>      2020 11:19  Phos  5.3     02-06  Mg     1.7     02-06    TPro  x   /  Alb  3.9  /  TBili  x   /  DBili  x   /  AST  x   /  ALT  x   /  AlkPhos  x   02-05      Urinalysis Basic - ( 2020 02:00 )    Color: YELLOW / Appearance: CLEAR / S.016 / pH: 6.0  Gluc: NEGATIVE / Ketone: NEGATIVE  / Bili: NEGATIVE / Urobili: NORMAL   Blood: NEGATIVE / Protein: NEGATIVE / Nitrite: NEGATIVE   Leuk Esterase: NEGATIVE / RBC: x / WBC x   Sq Epi: x / Non Sq Epi: x / Bacteria: x      CAPILLARY BLOOD GLUCOSE    RADIOLOGY & ADDITIONAL STUDIES:    Imaging:   Personally Reviewed:  [ ] YES                EKG:   Personally Reviewed:  [ ] YES     Consultant(s) notes reviewed:    Care Discussed with Consultant(s)/Other Providers: ENT VERONICA Goncalves re: endocrine recs, possible transfer to medicine in AM depending on Ca (if >8 then ENT can d/c patient...)  Care Discussed with Primary Team.      [] Increased delirium risk  [] Delirium and other risks can be reduced by:          -early ambulation          -minimizing "tethers" - IV, oxygen, catheters, etc          -avoiding hypnotics and sedatives          -maintaining hydration/nutrition          -avoid anticholinergics - diphenhydramine, etc          -pain control          -supportive environment

## 2020-02-06 NOTE — CONSULT NOTE ADULT - ASSESSMENT
54 yr old male recently diagnosed with papillary thyroid cancer underwent Total Thyroidectomy with modified neck dissection levels II, III, and IV on 1/31/20.  Endocrine team consulted for post op hypocalcemia and post op hypothyroidism.
53 yo M with HTN, recently diagnosed papillary thyroid cancer s/p total thyroidectomy, with neck dissection by ENT on 1/31, course c/b continued hypocalcemia.     # hypocalcemia 2/2 post-op hypoparathyroidism  - Ca 6.8 today, goal is 8-8.5 as per endocrine  - c/w calcium carbonate supplementation as per endocrine (increased to 4 tabs 4 times a day, to be given with meals and apart from synthroid)  - c/w calcitriol as per endocrine unable to increase further given hyperphosphatemia  - vitD deficiency, c/w ergocalciferol 50k U weekly x 8 weeks as per endocrine  - optimize Mg status as per endocrine, if low can consider Mg oxide 400 mg tid w/ meals  - as per endocrine can give prn calcium gluconate if serum Ca <7 or patient symptomatic  - otpt endocrine followup  - ENT requesting potential transfer to medicine given continued hypocalcemia and no further surgical intervention, d/w hospitalist in charge Dr. Sebastian, patient to remain on ENT service today, check calcium tomorrow, if >8, then ENT can discharge patient, if continues to remain low, then can reassess transfer to medicine...     # post-operative hypothyroidism  - c/w synthroid 150 mcg as per endocrine, to be dosed in AM on empty stomach and apart from other meds  - goal TSH <.1 given path result  - repeat TFTs as outpatient in 4 weeks    # papillary thyroid carcinoma  - wound care/pain control and bowel regimen as per ENT  - pathology results noted, to consider KATE treatment as outpatient  - to followup with Endocrine at:  [facility]  Dr. Kylee Reinoso DO, [address] [place] [address].   Appointment scheduled for 2/24/20 at 11 am.   [phone].     # Fever  - no clear infectious etiology, without leukocytosis, surgical site appears benign, patient non-toxic appearing, UA negative continue to monitor    # Cough  - no clear infectious etiology at this time  - c/w supportive care  - patient on lisinopril, if cough persists, could consider switching patient to ARB to see if it will improve, however if d/t ace-i, can still take awhile to resolve...     # Essential HTN  - on HCTZ and lisinopril  - BP currently in acceptable range  - monitor pressures and adjust regimen as needed     Dispo: pending improvement in calcium level (goal 8 - 8.5), outpatient endocrine followup

## 2020-02-07 LAB
ANION GAP SERPL CALC-SCNC: 13 MMO/L — SIGNIFICANT CHANGE UP (ref 7–14)
BACTERIA UR CULT: SIGNIFICANT CHANGE UP
BUN SERPL-MCNC: 22 MG/DL — SIGNIFICANT CHANGE UP (ref 7–23)
CALCIUM SERPL-MCNC: 6.5 MG/DL — CRITICAL LOW (ref 8.4–10.5)
CALCIUM SERPL-MCNC: 6.8 MG/DL — LOW (ref 8.4–10.5)
CALCIUM SERPL-MCNC: 6.8 MG/DL — LOW (ref 8.4–10.5)
CALCIUM SERPL-MCNC: 7 MG/DL — LOW (ref 8.4–10.5)
CHLORIDE SERPL-SCNC: 94 MMOL/L — LOW (ref 98–107)
CO2 SERPL-SCNC: 25 MMOL/L — SIGNIFICANT CHANGE UP (ref 22–31)
CREAT SERPL-MCNC: 1.27 MG/DL — SIGNIFICANT CHANGE UP (ref 0.5–1.3)
GLUCOSE SERPL-MCNC: 114 MG/DL — HIGH (ref 70–99)
HCT VFR BLD CALC: 35.4 % — LOW (ref 39–50)
HGB BLD-MCNC: 12.3 G/DL — LOW (ref 13–17)
MAGNESIUM SERPL-MCNC: 1.7 MG/DL — SIGNIFICANT CHANGE UP (ref 1.6–2.6)
MCHC RBC-ENTMCNC: 30.1 PG — SIGNIFICANT CHANGE UP (ref 27–34)
MCHC RBC-ENTMCNC: 34.7 % — SIGNIFICANT CHANGE UP (ref 32–36)
MCV RBC AUTO: 86.8 FL — SIGNIFICANT CHANGE UP (ref 80–100)
NRBC # FLD: 0 K/UL — SIGNIFICANT CHANGE UP (ref 0–0)
PHOSPHATE SERPL-MCNC: 5.4 MG/DL — HIGH (ref 2.5–4.5)
PLATELET # BLD AUTO: 262 K/UL — SIGNIFICANT CHANGE UP (ref 150–400)
PMV BLD: 9.2 FL — SIGNIFICANT CHANGE UP (ref 7–13)
POTASSIUM SERPL-MCNC: 3.8 MMOL/L — SIGNIFICANT CHANGE UP (ref 3.5–5.3)
POTASSIUM SERPL-SCNC: 3.8 MMOL/L — SIGNIFICANT CHANGE UP (ref 3.5–5.3)
RBC # BLD: 4.08 M/UL — LOW (ref 4.2–5.8)
RBC # FLD: 12 % — SIGNIFICANT CHANGE UP (ref 10.3–14.5)
SODIUM SERPL-SCNC: 132 MMOL/L — LOW (ref 135–145)
SPECIMEN SOURCE: SIGNIFICANT CHANGE UP
SPECIMEN SOURCE: SIGNIFICANT CHANGE UP
WBC # BLD: 5.42 K/UL — SIGNIFICANT CHANGE UP (ref 3.8–10.5)
WBC # FLD AUTO: 5.42 K/UL — SIGNIFICANT CHANGE UP (ref 3.8–10.5)

## 2020-02-07 PROCEDURE — 99233 SBSQ HOSP IP/OBS HIGH 50: CPT

## 2020-02-07 PROCEDURE — 99233 SBSQ HOSP IP/OBS HIGH 50: CPT | Mod: GC

## 2020-02-07 RX ORDER — CALCIUM GLUCONATE 100 MG/ML
1 VIAL (ML) INTRAVENOUS ONCE
Refills: 0 | Status: COMPLETED | OUTPATIENT
Start: 2020-02-07 | End: 2020-02-07

## 2020-02-07 RX ORDER — CALCITRIOL 0.5 UG/1
1 CAPSULE ORAL
Refills: 0 | Status: DISCONTINUED | OUTPATIENT
Start: 2020-02-07 | End: 2020-02-08

## 2020-02-07 RX ORDER — CALCIUM GLUCONATE 100 MG/ML
1 VIAL (ML) INTRAVENOUS
Refills: 0 | Status: COMPLETED | OUTPATIENT
Start: 2020-02-07 | End: 2020-02-07

## 2020-02-07 RX ORDER — OXYCODONE HYDROCHLORIDE 5 MG/1
5 TABLET ORAL EVERY 6 HOURS
Refills: 0 | Status: DISCONTINUED | OUTPATIENT
Start: 2020-02-08 | End: 2020-02-10

## 2020-02-07 RX ORDER — CALCITRIOL 0.5 UG/1
0.5 CAPSULE ORAL
Refills: 0 | Status: DISCONTINUED | OUTPATIENT
Start: 2020-02-07 | End: 2020-02-08

## 2020-02-07 RX ADMIN — Medication 3 MILLIGRAM(S): at 22:28

## 2020-02-07 RX ADMIN — CALCITRIOL 0.5 MICROGRAM(S): 0.5 CAPSULE ORAL at 17:16

## 2020-02-07 RX ADMIN — Medication 650 MILLIGRAM(S): at 02:00

## 2020-02-07 RX ADMIN — MAGNESIUM OXIDE 400 MG ORAL TABLET 400 MILLIGRAM(S): 241.3 TABLET ORAL at 08:38

## 2020-02-07 RX ADMIN — OXYCODONE HYDROCHLORIDE 5 MILLIGRAM(S): 5 TABLET ORAL at 11:41

## 2020-02-07 RX ADMIN — MAGNESIUM OXIDE 400 MG ORAL TABLET 400 MILLIGRAM(S): 241.3 TABLET ORAL at 12:14

## 2020-02-07 RX ADMIN — Medication 150 MICROGRAM(S): at 05:07

## 2020-02-07 RX ADMIN — Medication 50 GRAM(S): at 04:15

## 2020-02-07 RX ADMIN — MAGNESIUM OXIDE 400 MG ORAL TABLET 400 MILLIGRAM(S): 241.3 TABLET ORAL at 17:16

## 2020-02-07 RX ADMIN — OCTREOTIDE ACETATE 100 MICROGRAM(S): 200 INJECTION, SOLUTION INTRAVENOUS; SUBCUTANEOUS at 05:07

## 2020-02-07 RX ADMIN — Medication 4 TABLET(S): at 17:16

## 2020-02-07 RX ADMIN — OXYCODONE HYDROCHLORIDE 5 MILLIGRAM(S): 5 TABLET ORAL at 10:39

## 2020-02-07 RX ADMIN — OXYCODONE HYDROCHLORIDE 5 MILLIGRAM(S): 5 TABLET ORAL at 22:28

## 2020-02-07 RX ADMIN — Medication 4 TABLET(S): at 12:12

## 2020-02-07 RX ADMIN — Medication 4 TABLET(S): at 22:28

## 2020-02-07 RX ADMIN — CALCITRIOL 0.5 MICROGRAM(S): 0.5 CAPSULE ORAL at 06:56

## 2020-02-07 RX ADMIN — Medication 100 GRAM(S): at 22:29

## 2020-02-07 RX ADMIN — POLYETHYLENE GLYCOL 3350 17 GRAM(S): 17 POWDER, FOR SOLUTION ORAL at 12:10

## 2020-02-07 RX ADMIN — Medication 650 MILLIGRAM(S): at 01:19

## 2020-02-07 RX ADMIN — Medication 4 TABLET(S): at 08:38

## 2020-02-07 RX ADMIN — ERGOCALCIFEROL 50000 UNIT(S): 1.25 CAPSULE ORAL at 12:15

## 2020-02-07 RX ADMIN — Medication 50 GRAM(S): at 02:35

## 2020-02-07 RX ADMIN — OXYCODONE HYDROCHLORIDE 5 MILLIGRAM(S): 5 TABLET ORAL at 22:58

## 2020-02-07 RX ADMIN — Medication 100 GRAM(S): at 10:42

## 2020-02-07 RX ADMIN — OCTREOTIDE ACETATE 100 MICROGRAM(S): 200 INJECTION, SOLUTION INTRAVENOUS; SUBCUTANEOUS at 13:12

## 2020-02-07 NOTE — PROGRESS NOTE ADULT - SUBJECTIVE AND OBJECTIVE BOX
Follow-up on: Hypocalcemia     Subjective: Patient seen walking in walkway, says feeling fine and currently does not report any complaints. Calcium still low, on oral supplements     MEDICATIONS  (STANDING):  calcitriol   Capsule 0.5 MICROGram(s) Oral two times a day  calcium carbonate   1250 mG (OsCal) 4 Tablet(s) Oral <User Schedule>  ergocalciferol 33843 Unit(s) Oral every week  heparin  Injectable 5000 Unit(s) SubCutaneous every 12 hours  hydrochlorothiazide 25 milliGRAM(s) Oral daily  levothyroxine 150 MICROGram(s) Oral <User Schedule>  lisinopril 20 milliGRAM(s) Oral daily  magnesium oxide 400 milliGRAM(s) Oral three times a day with meals  polyethylene glycol 3350 17 Gram(s) Oral daily  senna 2 Tablet(s) Oral at bedtime    MEDICATIONS  (PRN):  acetaminophen   Tablet .. 650 milliGRAM(s) Oral every 6 hours PRN Mild Pain (1 - 3)  guaiFENesin  milliGRAM(s) Oral every 12 hours PRN congestion  melatonin 3 milliGRAM(s) Oral at bedtime PRN Insomnia  oxyCODONE    IR 5 milliGRAM(s) Oral every 6 hours PRN Moderate Pain (4 - 6)  oxyCODONE    IR 10 milliGRAM(s) Oral every 6 hours PRN Severe Pain (7 - 10)      PHYSICAL EXAM:  VITALS: T(C): 37.5 (02-07-20 @ 13:51)  T(F): 99.5 (02-07-20 @ 13:51), Max: 100.2 (02-07-20 @ 01:11)  HR: 79 (02-07-20 @ 13:51) (73 - 84)  BP: 116/75 (02-07-20 @ 13:51) (105/69 - 129/77)  RR:  (17 - 19)  SpO2:  (96% - 100%)  Wt(kg): --  GENERAL: NAD, well-groomed, well-developed  EYES: No proptosis, no injection  HEENT:  Atraumatic, Normocephalic, moist mucous membranes  Neck: dressing in place, supple  Neuro: AAO x3  Psych: reactive effect, euthymic mood         02-07    132<L>  |  94<L>  |  22  ----------------------------<  114<H>  3.8   |  25  |  1.27    EGFR if : 73  EGFR if non : 63    Ca    7.0<L>      02-07  Mg     1.7     02-07  Phos  5.4     02-07    TPro  x   /  Alb  3.9  /  TBili  x   /  DBili  x   /  AST  x   /  ALT  x   /  AlkPhos  x   02-05

## 2020-02-07 NOTE — PROGRESS NOTE ADULT - ASSESSMENT
55 yo M with HTN, recently diagnosed papillary thyroid cancer s/p total thyroidectomy, with neck dissection by ENT on 1/31, course c/b continued hypocalcemia.     # hypocalcemia 2/2 post-op hypoparathyroidism  - Ca still 6.8 today, goal is 8-8.5 as per endocrine  - c/w calcium carbonate supplementation as per endocrine (was increased to 4 tabs 4 times a day, to be given with meals and apart from synthroid)  - c/w calcitriol as per endocrine unable to increase further given hyperphosphatemia  - vitD deficiency, c/w ergocalciferol 50k U weekly x 8 weeks as per endocrine  - optimize Mg status as per endocrine, on Mg oxide 400 mg tid w/ meals  - as per endocrine can give prn calcium gluconate if serum Ca <7 or patient symptomatic (receiving dose this AM)  - f/u further endocrine recs  - otpt endocrine followup  - per d/w ENT, will transfer to medical service given continued hypocalcemia and no further surgical intervention, ENT to contact hospitalist in charge to coordinate timing of transfer    # post-operative hypothyroidism  - c/w synthroid 150 mcg as per endocrine, to be dosed in AM on empty stomach and apart from other meds  - goal TSH <.1 given path result  - repeat TFTs as outpatient in 4 weeks    # papillary thyroid carcinoma  - wound care/pain control and bowel regimen as per ENT  - pathology results noted, to consider KATE treatment as outpatient  - to followup with Endocrine at:  [facility]  Dr. Kylee Reinoso DO, [address] [place] [address].   Appointment scheduled for 2/24/20 at 11 am.   [phone].     # Fever  - no clear infectious etiology, without leukocytosis, surgical site appears benign, patient non-toxic appearing, UA negative, BCx NG24 hours, CXR w/ clear lungs, continue to monitor, currently afebrile    # Cough  - no clear infectious etiology at this time  - c/w supportive care  - patient on lisinopril, if cough persists, could consider switching patient to ARB to see if it will improve, however if d/t ace-i, can still take awhile to resolve...     # Essential HTN  - on HCTZ and lisinopril  - BP currently in acceptable range  - monitor pressures and adjust regimen as needed     Dispo: pending improvement in calcium level (goal 8 - 8.5), outpatient endocrine followup

## 2020-02-07 NOTE — DIETITIAN INITIAL EVALUATION ADULT. - OTHER INFO
54-year-old male patient with PMH of HTN, obesity, and thyroid cancer. Presented to hospital with malignant neoplasm of thyroid gland.     RD met with patient during time of encounter. Patient denies food allergies, nausea/vomiting/diarrhea/constipation, or issues with chewing. Patient reports last BM in AM, normal and formed. Expresses slight difficulty swallowing as "mouth is sore". Patient reports excellent appetite and PO intake. Patient previously on clear liquids before operation but now on regular diet and eating well. Patient with last reported weight of 82.6 kg on 1/31/20. Recommend new weight to ensure no fluctuations.     Patient currently receiving heparin, but states he no longer needs it as he is ambulatory. Educated patient on heparin and vitamin K interaction. Patient receptive. Patient also receiving calcitriol, calcium carbonate, and ergocalciferol. Discussed low vitamin D and calcium status with patient. In addition to supplementation, provided patient with examples of foods rich in calcium. Patient questioned his vitamin D status. Discussed commonality of vitamin D deficiency. Recommended he inquire about vitamin D supplementation with primary care physician. RD services to remain available.

## 2020-02-07 NOTE — PROGRESS NOTE ADULT - PROBLEM SELECTOR PLAN 1
- Secondary to post op hypoparathyroidism, intact PTH post op 4.49.   - s/p 2 gm IV calcium gluconate today, most recent calcium level noted to be 7; goal is 8-8.5   - Patient is currently not symptomatic   - Vitamin D 25 OH noted to be low 16.8, continue Ergocalciferol 50,000 units weekly for at least 8 weeks.  - Continue calcium carbonate 1250 mg -->4 tabs 4 times a day (with meals and at bedtime). Spoke to RN to make sure its given with meals and apart from LT4  - Continue calcitriol to 0.5 mcg bid, would avoid increase as phos level has been elevated, if persistently high would decrease further down to 0.5 mcg qd.  - Follow vitamin D 1,25-di OH levels   - Monitor serum calcium q8h along with daily albumin, Mg and phos  - Mg today noted to be 1.7. Continue Mg oxide 400 mg tid with meals  - Can give prn calcium gluconate if serum Ca <7 or patient is symptomatic.  - Outpatient endocrine follow up on discharge. - Secondary to post op hypoparathyroidism, intact PTH post op 4.49.   - s/p 2 gm IV calcium gluconate today, most recent calcium level noted to be 7; goal is 8-8.5   - Patient is currently not symptomatic   - Continue calcium carbonate 1250 mg -->4 tabs 4 times a day (with meals and at bedtime). Spoke to RN to make sure its given with meals and apart from LT4  - Continue calcitriol --> will increase dose to 1 mcg in am and 0.5 mcg in pm, monitor serum phosphorous level closely   - Vitamin D 25 OH noted to be low 16.8, continue Ergocalciferol 50,000 units weekly for at least 8 weeks.  - Vitamin D 1,25-di OH levels low normal 29.1  - Monitor serum calcium q8h along with daily albumin, Mg and phos  - Mg today noted to be 1.7. Continue Mg oxide 400 mg tid with meals  - Can give prn calcium gluconate if serum Ca <7 or patient is symptomatic.  - Outpatient endocrine follow up on discharge. - Secondary to post op hypoparathyroidism, intact PTH post op 4.49.   - s/p 2 gm IV calcium gluconate today, most recent calcium level noted to be 7; goal is 8-8.5   - Patient is currently not symptomatic   - Continue calcium carbonate 1250 mg -->4 tabs 4 times a day (with meals and at bedtime). Spoke to RN to make sure its given with meals and apart from LT4  - Continue calcitriol --> will increase dose to 1 mcg in am and 0.5 mcg in pm, monitor serum phosphorous level closely. May consider phosphate binder if needed.  - Vitamin D 25 OH noted to be low 16.8, continue Ergocalciferol 50,000 units weekly for at least 8 weeks.  - Vitamin D 1,25-di OH levels low normal 29.1  - Monitor serum calcium q8h along with daily albumin, Mg and phos  - Mg today noted to be 1.7. Continue Mg oxide 400 mg tid with meals  - Can give prn calcium gluconate if serum Ca <7 or patient is symptomatic.  - Outpatient endocrine follow up on discharge.

## 2020-02-07 NOTE — DIETITIAN INITIAL EVALUATION ADULT. - ADD RECOMMEND
1) Monitor weights, pertinent labs, BMs, skin integrity, and PO intake 2) RD services to remain available

## 2020-02-07 NOTE — PROGRESS NOTE ADULT - SUBJECTIVE AND OBJECTIVE BOX
[facility] Medicine  Pager [phone]    Patient is a 54y old  Male who presents with a chief complaint of Total thyroidectomy (05 Feb 2020 15:09)      INTERVAL HPI/OVERNIGHT EVENTS: Still with some cough/congestion but denies any SOB. Still with low grade temps (feels warm). Aside from cough, no dysuria/diarrhea/abdominal pain/rash etc. Still waiting for improvement in calcium levels, denies weakness/numbness. Ordered for IV calcium gluconate.     MEDICATIONS  (STANDING):  calcitriol   Capsule 0.5 MICROGram(s) Oral two times a day  calcium carbonate   1250 mG (OsCal) 4 Tablet(s) Oral <User Schedule>  ergocalciferol 41456 Unit(s) Oral every week  heparin  Injectable 5000 Unit(s) SubCutaneous every 12 hours  hydrochlorothiazide 25 milliGRAM(s) Oral daily  levothyroxine 150 MICROGram(s) Oral <User Schedule>  lisinopril 20 milliGRAM(s) Oral daily  magnesium oxide 400 milliGRAM(s) Oral three times a day with meals  octreotide  Injectable 100 MICROGram(s) SubCutaneous every 8 hours  polyethylene glycol 3350 17 Gram(s) Oral daily  senna 2 Tablet(s) Oral at bedtime    MEDICATIONS  (PRN):  acetaminophen   Tablet .. 650 milliGRAM(s) Oral every 6 hours PRN Mild Pain (1 - 3)  guaiFENesin  milliGRAM(s) Oral every 12 hours PRN congestion  melatonin 3 milliGRAM(s) Oral at bedtime PRN Insomnia  oxyCODONE    IR 5 milliGRAM(s) Oral every 6 hours PRN Moderate Pain (4 - 6)  oxyCODONE    IR 10 milliGRAM(s) Oral every 6 hours PRN Severe Pain (7 - 10)    Allergies  No Known Allergies    Intolerances    REVIEW OF SYSTEMS:  Please see interval HPI:    Vital Signs Last 24 Hrs  T(C): 37.5 (07 Feb 2020 10:20), Max: 37.9 (07 Feb 2020 01:11)  T(F): 99.5 (07 Feb 2020 10:20), Max: 100.2 (07 Feb 2020 01:11)  HR: 83 (07 Feb 2020 10:20) (75 - 84)  BP: 119/76 (07 Feb 2020 10:20) (103/67 - 119/76)  BP(mean): --  RR: 18 (07 Feb 2020 10:20) (16 - 18)  SpO2: 99% (07 Feb 2020 10:20) (97% - 100%)  I&O's Detail    06 Feb 2020 07:01  -  07 Feb 2020 07:00  --------------------------------------------------------  IN:    IV PiggyBack: 100 mL    Oral Fluid: 1320 mL  Total IN: 1420 mL    OUT:    Voided: 600 mL  Total OUT: 600 mL    Total NET: 820 mL      07 Feb 2020 07:01  -  07 Feb 2020 11:53  --------------------------------------------------------  IN:    Solution: 50 mL  Total IN: 50 mL    OUT:  Total OUT: 0 mL    Total NET: 50 mL    PHYSICAL EXAM:  GENERAL: NAD, sitting in chair  HEAD:  NC/AT  EYES: EOMI, clear sclera/conjunctiva  ENMT: MMM  NECK: horizontal neck incision c/d/i, no erythema or drainage  NERVOUS SYSTEM: non-focal, moving all extremities, able to ambulate   CHEST/LUNG: CTAB, comfortable on RA  HEART: S1S2 RRR  ABDOMEN: soft, non-tender  EXTREMITIES:  no c/c/e  SKIN: incision as above    LABS:                        12.3   5.42  )-----------( 262      ( 07 Feb 2020 06:30 )             35.4     07 Feb 2020 06:30    132    |  94     |  22     ----------------------------<  114    3.8     |  25     |  1.27     Ca    6.8        07 Feb 2020 06:30  Phos  5.4       07 Feb 2020 06:30  Mg     1.7       07 Feb 2020 06:30    CAPILLARY BLOOD GLUCOSE    BLOOD CULTURE  02-06 @ 00:21 --    NO ORGANISMS ISOLATED  NO ORGANISMS ISOLATED AT 24 HOURS  --    RADIOLOGY & ADDITIONAL TESTS:    Imaging Personally Reviewed:  [ ] YES   < from: Xray Chest 1 View- PORTABLE-Urgent (02.06.20 @ 22:15) >  IMPRESSION:    Linear opacity at the right base is indeterminant and may represent atelectasis or pleural thickening/calcification. The lungs are otherwise clear. No pleural effusion or pneumothorax.    The cardiomediastinal silhouette is normal in size and contour. Old fracture deformity of right lower ribs.    < end of copied text >    Consultant(s) Notes Reviewed:  Endo, ENT    Care Discussed with Consultants/Other Providers: ENT VERONICA Goncalves re: continued hypocalcemia, plan for transfer to medicine

## 2020-02-07 NOTE — DIETITIAN INITIAL EVALUATION ADULT. - PERTINENT MEDS FT
MEDICATIONS  (STANDING):  calcitriol   Capsule 0.5 MICROGram(s) Oral two times a day  calcium carbonate   1250 mG (OsCal) 4 Tablet(s) Oral <User Schedule>  ergocalciferol 06332 Unit(s) Oral every week  heparin  Injectable 5000 Unit(s) SubCutaneous every 12 hours  hydrochlorothiazide 25 milliGRAM(s) Oral daily  levothyroxine 150 MICROGram(s) Oral <User Schedule>  lisinopril 20 milliGRAM(s) Oral daily  magnesium oxide 400 milliGRAM(s) Oral three times a day with meals  octreotide  Injectable 100 MICROGram(s) SubCutaneous every 8 hours  polyethylene glycol 3350 17 Gram(s) Oral daily  senna 2 Tablet(s) Oral at bedtime    MEDICATIONS  (PRN):  acetaminophen   Tablet .. 650 milliGRAM(s) Oral every 6 hours PRN Mild Pain (1 - 3)  guaiFENesin  milliGRAM(s) Oral every 12 hours PRN congestion  melatonin 3 milliGRAM(s) Oral at bedtime PRN Insomnia  oxyCODONE    IR 5 milliGRAM(s) Oral every 6 hours PRN Moderate Pain (4 - 6)  oxyCODONE    IR 10 milliGRAM(s) Oral every 6 hours PRN Severe Pain (7 - 10)

## 2020-02-07 NOTE — PROGRESS NOTE ADULT - SUBJECTIVE AND OBJECTIVE BOX
Patient seen and examined at bedside. No acute events overnight.     Calcium: 6.8, 6.5, 6.7 s/p 2 g IV    T(C): 37.3 (02-07-20 @ 05:04), Max: 37.9 (02-07-20 @ 01:11)  HR: 75 (02-07-20 @ 05:04) (72 - 84)  BP: 105/69 (02-07-20 @ 05:04) (103/67 - 112/76)  RR: 17 (02-07-20 @ 05:04) (16 - 18)  SpO2: 98% (02-07-20 @ 05:04) (97% - 100%)    Well appearing, NAD  Breathing comfortably on RA, no stridor, no stertor  NC clear anteriorly, no epistaxis  OC mucosa pink and moist,   Neck with horizontal incision, c/d/i, steristrips in place, mild barbie-incisional edema, no hematoma, no crepitus, no skin changes, tender to palpation      A/P: 53yo M with PMH thyroid cancer s/p total thyroidectomy, bl SND, central neck dissection 1/31, c/b chyle leak.   - q6h calciums until stable  - Regular diet  - continue calcium 3750QID, rocal .5BID  - levothyroxine  - pain control  - bowel regimen  - OOB  - SQH for DVT ppx

## 2020-02-07 NOTE — PROGRESS NOTE ADULT - PROBLEM SELECTOR PLAN 3
- s/p Total Thyroidectomy with modified neck dissection levels II, III, and IV on 1/31/20 for 6 m PTC with LN involvement.  - Path result noted. High risk. Will consider KATE treatment as outpatient.   - Continue with levothyroxine 150 mcg qd, as mentioned above.  Patient to follow up with [facility]  Dr. Kylee Reinoso DO, [address] [place] [address].   Appointment scheduled for 2/24/20 at 11 am.   [phone].

## 2020-02-07 NOTE — PROGRESS NOTE ADULT - PROBLEM SELECTOR PLAN 2
- Continue with levothyroxine 150 mcg qd, to be given in on empty stomach, wait at least half an hr before eating or taking other meds and should be 4 hrs apart from calcium or MVI (Noted to be given along with Ca today at 6 am, spoke to RN and reinforced to be given LT4 at 5 am and Ca with meals)  - Check TFTs as outpatient in 4 weeks   - Goal TSH <0.1 based on path result

## 2020-02-08 DIAGNOSIS — Z29.9 ENCOUNTER FOR PROPHYLACTIC MEASURES, UNSPECIFIED: ICD-10-CM

## 2020-02-08 DIAGNOSIS — R05 COUGH: ICD-10-CM

## 2020-02-08 DIAGNOSIS — Z02.9 ENCOUNTER FOR ADMINISTRATIVE EXAMINATIONS, UNSPECIFIED: ICD-10-CM

## 2020-02-08 DIAGNOSIS — I10 ESSENTIAL (PRIMARY) HYPERTENSION: ICD-10-CM

## 2020-02-08 DIAGNOSIS — R50.9 FEVER, UNSPECIFIED: ICD-10-CM

## 2020-02-08 LAB
ALBUMIN SERPL ELPH-MCNC: 3.6 G/DL — SIGNIFICANT CHANGE UP (ref 3.3–5)
ANION GAP SERPL CALC-SCNC: 12 MMO/L — SIGNIFICANT CHANGE UP (ref 7–14)
B PERT DNA SPEC QL NAA+PROBE: NOT DETECTED — SIGNIFICANT CHANGE UP
BUN SERPL-MCNC: 19 MG/DL — SIGNIFICANT CHANGE UP (ref 7–23)
C PNEUM DNA SPEC QL NAA+PROBE: NOT DETECTED — SIGNIFICANT CHANGE UP
CALCIUM SERPL-MCNC: 6.8 MG/DL — LOW (ref 8.4–10.5)
CALCIUM SERPL-MCNC: 6.9 MG/DL — LOW (ref 8.4–10.5)
CALCIUM SERPL-MCNC: 7.2 MG/DL — LOW (ref 8.4–10.5)
CHLORIDE SERPL-SCNC: 97 MMOL/L — LOW (ref 98–107)
CO2 SERPL-SCNC: 24 MMOL/L — SIGNIFICANT CHANGE UP (ref 22–31)
CREAT SERPL-MCNC: 1.04 MG/DL — SIGNIFICANT CHANGE UP (ref 0.5–1.3)
FLUAV H1 2009 PAND RNA SPEC QL NAA+PROBE: NOT DETECTED — SIGNIFICANT CHANGE UP
FLUAV H1 RNA SPEC QL NAA+PROBE: NOT DETECTED — SIGNIFICANT CHANGE UP
FLUAV H3 RNA SPEC QL NAA+PROBE: NOT DETECTED — SIGNIFICANT CHANGE UP
FLUAV SUBTYP SPEC NAA+PROBE: NOT DETECTED — SIGNIFICANT CHANGE UP
FLUBV RNA SPEC QL NAA+PROBE: DETECTED — HIGH
GLUCOSE SERPL-MCNC: 95 MG/DL — SIGNIFICANT CHANGE UP (ref 70–99)
HADV DNA SPEC QL NAA+PROBE: NOT DETECTED — SIGNIFICANT CHANGE UP
HCOV PNL SPEC NAA+PROBE: SIGNIFICANT CHANGE UP
HMPV RNA SPEC QL NAA+PROBE: NOT DETECTED — SIGNIFICANT CHANGE UP
HPIV1 RNA SPEC QL NAA+PROBE: NOT DETECTED — SIGNIFICANT CHANGE UP
HPIV2 RNA SPEC QL NAA+PROBE: NOT DETECTED — SIGNIFICANT CHANGE UP
HPIV3 RNA SPEC QL NAA+PROBE: NOT DETECTED — SIGNIFICANT CHANGE UP
HPIV4 RNA SPEC QL NAA+PROBE: NOT DETECTED — SIGNIFICANT CHANGE UP
MAGNESIUM SERPL-MCNC: 1.7 MG/DL — SIGNIFICANT CHANGE UP (ref 1.6–2.6)
PHOSPHATE SERPL-MCNC: 3.8 MG/DL — SIGNIFICANT CHANGE UP (ref 2.5–4.5)
PHOSPHATE SERPL-MCNC: 4.2 MG/DL — SIGNIFICANT CHANGE UP (ref 2.5–4.5)
POTASSIUM SERPL-MCNC: 4.1 MMOL/L — SIGNIFICANT CHANGE UP (ref 3.5–5.3)
POTASSIUM SERPL-SCNC: 4.1 MMOL/L — SIGNIFICANT CHANGE UP (ref 3.5–5.3)
RSV RNA SPEC QL NAA+PROBE: NOT DETECTED — SIGNIFICANT CHANGE UP
RV+EV RNA SPEC QL NAA+PROBE: NOT DETECTED — SIGNIFICANT CHANGE UP
SODIUM SERPL-SCNC: 133 MMOL/L — LOW (ref 135–145)
SPECIMEN SOURCE: SIGNIFICANT CHANGE UP
VIT D25+D1,25 OH+D1,25 PNL SERPL-MCNC: 35.7 PG/ML — SIGNIFICANT CHANGE UP (ref 19.9–79.3)

## 2020-02-08 PROCEDURE — 99233 SBSQ HOSP IP/OBS HIGH 50: CPT

## 2020-02-08 RX ORDER — CALCITRIOL 0.5 UG/1
1 CAPSULE ORAL EVERY 12 HOURS
Refills: 0 | Status: DISCONTINUED | OUTPATIENT
Start: 2020-02-08 | End: 2020-02-10

## 2020-02-08 RX ORDER — MAGNESIUM SULFATE 500 MG/ML
1 VIAL (ML) INJECTION ONCE
Refills: 0 | Status: COMPLETED | OUTPATIENT
Start: 2020-02-08 | End: 2020-02-08

## 2020-02-08 RX ORDER — CALCIUM GLUCONATE 100 MG/ML
1 VIAL (ML) INTRAVENOUS ONCE
Refills: 0 | Status: COMPLETED | OUTPATIENT
Start: 2020-02-08 | End: 2020-02-08

## 2020-02-08 RX ORDER — OXYCODONE HYDROCHLORIDE 5 MG/1
10 TABLET ORAL EVERY 6 HOURS
Refills: 0 | Status: DISCONTINUED | OUTPATIENT
Start: 2020-02-08 | End: 2020-02-10

## 2020-02-08 RX ADMIN — CALCITRIOL 1 MICROGRAM(S): 0.5 CAPSULE ORAL at 08:34

## 2020-02-08 RX ADMIN — Medication 75 MILLIGRAM(S): at 22:12

## 2020-02-08 RX ADMIN — Medication 25 MILLIGRAM(S): at 06:54

## 2020-02-08 RX ADMIN — OXYCODONE HYDROCHLORIDE 5 MILLIGRAM(S): 5 TABLET ORAL at 07:24

## 2020-02-08 RX ADMIN — Medication 100 GRAM(S): at 13:02

## 2020-02-08 RX ADMIN — OXYCODONE HYDROCHLORIDE 5 MILLIGRAM(S): 5 TABLET ORAL at 06:54

## 2020-02-08 RX ADMIN — Medication 4 TABLET(S): at 13:26

## 2020-02-08 RX ADMIN — Medication 3 MILLIGRAM(S): at 23:19

## 2020-02-08 RX ADMIN — Medication 4 TABLET(S): at 10:12

## 2020-02-08 RX ADMIN — LISINOPRIL 20 MILLIGRAM(S): 2.5 TABLET ORAL at 06:54

## 2020-02-08 RX ADMIN — MAGNESIUM OXIDE 400 MG ORAL TABLET 400 MILLIGRAM(S): 241.3 TABLET ORAL at 13:02

## 2020-02-08 RX ADMIN — Medication 150 MICROGRAM(S): at 05:06

## 2020-02-08 RX ADMIN — Medication 4 TABLET(S): at 19:05

## 2020-02-08 RX ADMIN — Medication 100 GRAM(S): at 16:57

## 2020-02-08 RX ADMIN — Medication 600 MILLIGRAM(S): at 22:12

## 2020-02-08 RX ADMIN — Medication 600 MILLIGRAM(S): at 06:54

## 2020-02-08 RX ADMIN — CALCITRIOL 1 MICROGRAM(S): 0.5 CAPSULE ORAL at 18:24

## 2020-02-08 RX ADMIN — Medication 4 TABLET(S): at 22:19

## 2020-02-08 RX ADMIN — MAGNESIUM OXIDE 400 MG ORAL TABLET 400 MILLIGRAM(S): 241.3 TABLET ORAL at 18:24

## 2020-02-08 RX ADMIN — MAGNESIUM OXIDE 400 MG ORAL TABLET 400 MILLIGRAM(S): 241.3 TABLET ORAL at 09:39

## 2020-02-08 NOTE — PROGRESS NOTE ADULT - PROBLEM SELECTOR PLAN 8
Transitions of Care Status:  1.  Name of PCP: Dr Joshi [phone]  2.  PCP Contacted on Admission: [ ] Y    [x ] N    3.  PCP contacted at Discharge: [ ] Y    [ ] N    [ ] N/A  4.  Post-Discharge Appointment Date and Location:  5.  Summary of Handoff given to PCP:

## 2020-02-08 NOTE — PROGRESS NOTE ADULT - PROBLEM SELECTOR PLAN 3
- wound care/pain control and bowel regimen as per ENT  - pathology results noted, to consider KATE treatment as outpatient  - to followup with Endocrine at:  [facility]  Dr. Kylee Reinoso DO, [address] [place] [address].   Appointment scheduled for 2/24/20 at 11 am.   [phone].

## 2020-02-08 NOTE — PROGRESS NOTE ADULT - SUBJECTIVE AND OBJECTIVE BOX
[facility]  Robb Celaya MD  Pager #[phone]    Patient is a 54y old  Male who presents with a chief complaint of Total thyroidectomy (05 Feb 2020 15:09)    INTERVAL HPI/OVERNIGHT EVENTS: No acute events. Pt still reports some cough, mild sore throat and congestion but denies any SOB or difficulty breathing.  No chest pain, nausea, vomiting, joint aches, constipation.     MEDICATIONS  (STANDING):  calcitriol   Capsule 0.5 MICROGram(s) Oral <User Schedule>  calcitriol   Capsule 1 MICROGram(s) Oral <User Schedule>  calcium carbonate   1250 mG (OsCal) 4 Tablet(s) Oral <User Schedule>  calcium gluconate IVPB 1 Gram(s) IV Intermittent once  ergocalciferol 96866 Unit(s) Oral every week  heparin  Injectable 5000 Unit(s) SubCutaneous every 12 hours  hydrochlorothiazide 25 milliGRAM(s) Oral daily  levothyroxine 150 MICROGram(s) Oral <User Schedule>  lisinopril 20 milliGRAM(s) Oral daily  magnesium oxide 400 milliGRAM(s) Oral three times a day with meals  polyethylene glycol 3350 17 Gram(s) Oral daily  senna 2 Tablet(s) Oral at bedtime    MEDICATIONS  (PRN):  acetaminophen   Tablet .. 650 milliGRAM(s) Oral every 6 hours PRN Mild Pain (1 - 3)  guaiFENesin  milliGRAM(s) Oral every 12 hours PRN congestion  melatonin 3 milliGRAM(s) Oral at bedtime PRN Insomnia  oxyCODONE    IR 10 milliGRAM(s) Oral every 6 hours PRN Severe Pain (7 - 10)  oxyCODONE    IR 5 milliGRAM(s) Oral every 6 hours PRN Moderate Pain (4 - 6)    Vital Signs Last 24 Hrs  T(C): 38.4 (08 Feb 2020 10:12), Max: 38.4 (08 Feb 2020 10:11)  T(F): 101.1 (08 Feb 2020 10:12), Max: 101.2 (08 Feb 2020 10:11)  HR: 88 (08 Feb 2020 10:12) (70 - 88)  BP: 122/60 (08 Feb 2020 10:12) (119/76 - 127/77)  BP(mean): --  RR: 18 (08 Feb 2020 10:12) (17 - 19)  SpO2: 98% (08 Feb 2020 10:12) (94% - 100%)    I&O's Detail    07 Feb 2020 07:01  -  08 Feb 2020 07:00  --------------------------------------------------------  IN:    IV PiggyBack: 50 mL    Oral Fluid: 240 mL    Solution: 50 mL  Total IN: 340 mL    OUT:  Total OUT: 0 mL    Total NET: 340 mL    CAPILLARY BLOOD GLUCOSE    PHYSICAL EXAM:  GENERAL: NAD, sitting up in bed  HEAD:  NC/AT  EYES: EOMI, clear sclera/conjunctiva  ENMT: MMM  NECK: horizontal neck incision c/d/i, no erythema or drainage  NERVOUS SYSTEM: non-focal, moving all extremities  CHEST/LUNG: CTAB, no crackles or wheezing   HEART: rrr, nl s1,s2, no mrg  ABDOMEN: soft, non-tender, nondistended, +BS  EXTREMITIES:  no significant edema, warm  SKIN: neck incision, no other lesions or rashes appreciated    LABS:                         12.3   5.42  )-----------( 262      ( 07 Feb 2020 06:30 )             35.4     02-08    133<L>  |  97<L>  |  19  ----------------------------<  95  4.1   |  24  |  1.04    Ca    6.8<L>      08 Feb 2020 14:00  Phos  4.2     02-08  Mg     1.7     02-08    TPro  x   /  Alb  3.6  /  TBili  x   /  DBili  x   /  AST  x   /  ALT  x   /  AlkPhos  x   02-08    RADIOLOGY, EKG & ADDITIONAL TESTS: Reviewed.     Imaging Personally Reviewed:  [x] YES   < from: Xray Chest 1 View- PORTABLE-Urgent (02.06.20 @ 22:15) >  IMPRESSION:    Linear opacity at the right base is indeterminant and may represent atelectasis or pleural thickening/calcification. The lungs are otherwise clear. No pleural effusion or pneumothorax.    The cardiomediastinal silhouette is normal in size and contour. Old fracture deformity of right lower ribs.    < end of copied text >

## 2020-02-08 NOTE — PROGRESS NOTE ADULT - SUBJECTIVE AND OBJECTIVE BOX
Patient seen and examined at bedside. No acute events overnight.   s/p 1g IV Ca gluconate overnight for corrected Ca 6.9    T(F): 100.9 (08 Feb 2020 06:46), Max: 100.9 (08 Feb 2020 06:46)  HR: 86 (08 Feb 2020 06:46) (70 - 86)  BP: 121/78 (08 Feb 2020 06:46) (116/75 - 129/77)  RR: 18 (08 Feb 2020 06:46) (17 - 19)  SpO2: 98% (08 Feb 2020 06:46) (94% - 100%)    Well appearing, NAD  Breathing comfortably on RA, no stridor, no stertor  NC clear anteriorly, no epistaxis  OC mucosa pink and moist,   Neck with horizontal incision, c/d/i, steristrips in place, mild barbie-incisional edema, no hematoma, no crepitus, no skin changes, tender to palpation      A/P: 55yo M with PMH thyroid cancer s/p total thyroidectomy, bl SND, central neck dissection 1/31, c/b chyle leak.   - q6h calciums until stable  - Regular diet  - continue calcium 5000QID, rocal .5BID  - levothyroxine  - pain control  - bowel regimen  - OOB  - SQH for DVT ppx

## 2020-02-08 NOTE — PROGRESS NOTE ADULT - PROBLEM SELECTOR PLAN 2
- c/w synthroid 150 mcg as per endocrine, to be dosed in AM on empty stomach and apart from other meds  - goal TSH <.1 given path result  - repeat TFTs as outpatient in 4 weeks

## 2020-02-08 NOTE — PROGRESS NOTE ADULT - PROBLEM SELECTOR PLAN 6
- on HCTZ and lisinopril  - BP currently in acceptable range  - monitor pressures and adjust regimen as needed

## 2020-02-08 NOTE — PROGRESS NOTE ADULT - PROBLEM SELECTOR PLAN 5
- no clear infectious etiology at this time  - c/w supportive care  - patient on lisinopril, if cough persists, could consider switching patient to ARB to see if it will improve, however if d/t ace-i, can still take awhile to resolve... - no clear infectious etiology at this time  - c/w supportive care  - patient on lisinopril, if cough persists, could consider switching patient to ARB to see if it will improve, however if d/t ace-i, can still take awhile to resolve...  - Check RVP

## 2020-02-08 NOTE — PROGRESS NOTE ADULT - PROBLEM SELECTOR PLAN 4
- no clear infectious etiology, without leukocytosis, surgical site appears benign, patient non-toxic appearing, UA negative, BCx NG24 hours, CXR w/ clear lungs, continue to monitor, currently afebrile Continues to spike, 101.2 this AM  - no clear infectious etiology, without leukocytosis, surgical site appears benign, patient non-toxic appearing, UA negative, BCx and UCx no growth to date, CXR w/ clear lungs  - Given continued URI symptoms and fever, will check RVP at this time

## 2020-02-08 NOTE — PROGRESS NOTE ADULT - PROBLEM SELECTOR PLAN 1
2/2 post-op hypoparathyroidism  - Ca still 6.8 today, goal is 8-8.5 as per endocrine  - c/w calcium supplementation as per endocrine  - c/w calcitriol as per endocrine unable to increase further given hyperphosphatemia  - vitD deficiency, c/w ergocalciferol 50k U weekly x 8 weeks as per endocrine  - optimize Mg status as per endocrine, on Mg oxide 400 mg tid w/ meals  - as per endocrine can give prn calcium gluconate if serum Ca <7 or patient symptomatic (receiving dose this AM)  - f/u further endocrine recs  - outpt endocrine followup

## 2020-02-08 NOTE — PROGRESS NOTE ADULT - ASSESSMENT
53 yo M with HTN, recently diagnosed papillary thyroid cancer s/p total thyroidectomy, with neck dissection by ENT on 1/31, course c/b continued hypocalcemia.

## 2020-02-09 DIAGNOSIS — J10.1 INFLUENZA DUE TO OTHER IDENTIFIED INFLUENZA VIRUS WITH OTHER RESPIRATORY MANIFESTATIONS: ICD-10-CM

## 2020-02-09 LAB
ALBUMIN SERPL ELPH-MCNC: 3.8 G/DL — SIGNIFICANT CHANGE UP (ref 3.3–5)
ANION GAP SERPL CALC-SCNC: 11 MMO/L — SIGNIFICANT CHANGE UP (ref 7–14)
BASOPHILS # BLD AUTO: 0.01 K/UL — SIGNIFICANT CHANGE UP (ref 0–0.2)
BASOPHILS NFR BLD AUTO: 0.1 % — SIGNIFICANT CHANGE UP (ref 0–2)
BUN SERPL-MCNC: 18 MG/DL — SIGNIFICANT CHANGE UP (ref 7–23)
CALCIUM SERPL-MCNC: 7.4 MG/DL — LOW (ref 8.4–10.5)
CALCIUM SERPL-MCNC: 7.6 MG/DL — LOW (ref 8.4–10.5)
CALCIUM SERPL-MCNC: 7.6 MG/DL — LOW (ref 8.4–10.5)
CHLORIDE SERPL-SCNC: 94 MMOL/L — LOW (ref 98–107)
CO2 SERPL-SCNC: 25 MMOL/L — SIGNIFICANT CHANGE UP (ref 22–31)
CREAT SERPL-MCNC: 1.11 MG/DL — SIGNIFICANT CHANGE UP (ref 0.5–1.3)
EOSINOPHIL # BLD AUTO: 0.09 K/UL — SIGNIFICANT CHANGE UP (ref 0–0.5)
EOSINOPHIL NFR BLD AUTO: 1.3 % — SIGNIFICANT CHANGE UP (ref 0–6)
GLUCOSE SERPL-MCNC: 104 MG/DL — HIGH (ref 70–99)
HCT VFR BLD CALC: 34.8 % — LOW (ref 39–50)
HGB BLD-MCNC: 12.1 G/DL — LOW (ref 13–17)
IMM GRANULOCYTES NFR BLD AUTO: 0.7 % — SIGNIFICANT CHANGE UP (ref 0–1.5)
LYMPHOCYTES # BLD AUTO: 1.45 K/UL — SIGNIFICANT CHANGE UP (ref 1–3.3)
LYMPHOCYTES # BLD AUTO: 21.5 % — SIGNIFICANT CHANGE UP (ref 13–44)
MAGNESIUM SERPL-MCNC: 2 MG/DL — SIGNIFICANT CHANGE UP (ref 1.6–2.6)
MCHC RBC-ENTMCNC: 29.7 PG — SIGNIFICANT CHANGE UP (ref 27–34)
MCHC RBC-ENTMCNC: 34.8 % — SIGNIFICANT CHANGE UP (ref 32–36)
MCV RBC AUTO: 85.5 FL — SIGNIFICANT CHANGE UP (ref 80–100)
MONOCYTES # BLD AUTO: 0.55 K/UL — SIGNIFICANT CHANGE UP (ref 0–0.9)
MONOCYTES NFR BLD AUTO: 8.1 % — SIGNIFICANT CHANGE UP (ref 2–14)
NEUTROPHILS # BLD AUTO: 4.6 K/UL — SIGNIFICANT CHANGE UP (ref 1.8–7.4)
NEUTROPHILS NFR BLD AUTO: 68.3 % — SIGNIFICANT CHANGE UP (ref 43–77)
NRBC # FLD: 0 K/UL — SIGNIFICANT CHANGE UP (ref 0–0)
PHOSPHATE SERPL-MCNC: 3.8 MG/DL — SIGNIFICANT CHANGE UP (ref 2.5–4.5)
PLATELET # BLD AUTO: 263 K/UL — SIGNIFICANT CHANGE UP (ref 150–400)
PMV BLD: 8.9 FL — SIGNIFICANT CHANGE UP (ref 7–13)
POTASSIUM SERPL-MCNC: 4.3 MMOL/L — SIGNIFICANT CHANGE UP (ref 3.5–5.3)
POTASSIUM SERPL-SCNC: 4.3 MMOL/L — SIGNIFICANT CHANGE UP (ref 3.5–5.3)
RBC # BLD: 4.07 M/UL — LOW (ref 4.2–5.8)
RBC # FLD: 12 % — SIGNIFICANT CHANGE UP (ref 10.3–14.5)
SODIUM SERPL-SCNC: 130 MMOL/L — LOW (ref 135–145)
WBC # BLD: 6.75 K/UL — SIGNIFICANT CHANGE UP (ref 3.8–10.5)
WBC # FLD AUTO: 6.75 K/UL — SIGNIFICANT CHANGE UP (ref 3.8–10.5)

## 2020-02-09 PROCEDURE — 99233 SBSQ HOSP IP/OBS HIGH 50: CPT

## 2020-02-09 RX ADMIN — Medication 75 MILLIGRAM(S): at 06:04

## 2020-02-09 RX ADMIN — Medication 4 TABLET(S): at 07:55

## 2020-02-09 RX ADMIN — MAGNESIUM OXIDE 400 MG ORAL TABLET 400 MILLIGRAM(S): 241.3 TABLET ORAL at 12:16

## 2020-02-09 RX ADMIN — MAGNESIUM OXIDE 400 MG ORAL TABLET 400 MILLIGRAM(S): 241.3 TABLET ORAL at 07:55

## 2020-02-09 RX ADMIN — HEPARIN SODIUM 5000 UNIT(S): 5000 INJECTION INTRAVENOUS; SUBCUTANEOUS at 17:51

## 2020-02-09 RX ADMIN — OXYCODONE HYDROCHLORIDE 5 MILLIGRAM(S): 5 TABLET ORAL at 06:04

## 2020-02-09 RX ADMIN — OXYCODONE HYDROCHLORIDE 5 MILLIGRAM(S): 5 TABLET ORAL at 22:35

## 2020-02-09 RX ADMIN — Medication 3 MILLIGRAM(S): at 21:34

## 2020-02-09 RX ADMIN — Medication 4 TABLET(S): at 21:34

## 2020-02-09 RX ADMIN — OXYCODONE HYDROCHLORIDE 5 MILLIGRAM(S): 5 TABLET ORAL at 07:01

## 2020-02-09 RX ADMIN — OXYCODONE HYDROCHLORIDE 5 MILLIGRAM(S): 5 TABLET ORAL at 13:15

## 2020-02-09 RX ADMIN — OXYCODONE HYDROCHLORIDE 5 MILLIGRAM(S): 5 TABLET ORAL at 12:16

## 2020-02-09 RX ADMIN — MAGNESIUM OXIDE 400 MG ORAL TABLET 400 MILLIGRAM(S): 241.3 TABLET ORAL at 17:51

## 2020-02-09 RX ADMIN — Medication 75 MILLIGRAM(S): at 17:51

## 2020-02-09 RX ADMIN — Medication 4 TABLET(S): at 12:16

## 2020-02-09 RX ADMIN — OXYCODONE HYDROCHLORIDE 5 MILLIGRAM(S): 5 TABLET ORAL at 21:34

## 2020-02-09 RX ADMIN — CALCITRIOL 1 MICROGRAM(S): 0.5 CAPSULE ORAL at 17:51

## 2020-02-09 RX ADMIN — Medication 150 MICROGRAM(S): at 05:02

## 2020-02-09 RX ADMIN — Medication 4 TABLET(S): at 17:51

## 2020-02-09 RX ADMIN — CALCITRIOL 1 MICROGRAM(S): 0.5 CAPSULE ORAL at 06:04

## 2020-02-09 NOTE — PROVIDER CONTACT NOTE (OTHER) - ACTION/TREATMENT ORDERED:
MD notified. States for no intervention at this time. Offered to administer tylenol PO, states not at this time d/t potential of masking fever. Will continue to monitor.

## 2020-02-09 NOTE — PROGRESS NOTE ADULT - PROBLEM SELECTOR PLAN 2
2/2 post-op hypoparathyroidism  - Ca slightly uptrending, goal is 8-8.5 as per endocrine  - c/w calcium supplementation as per endocrine  - c/w calcitriol as per endocrine unable to increase further given hyperphosphatemia  - vitD deficiency, c/w ergocalciferol 50k U weekly x 8 weeks as per endocrine  - optimize Mg status as per endocrine, on Mg oxide 400 mg tid w/ meals  - as per endocrine can give prn calcium gluconate if serum Ca <7 or patient symptomatic (receiving dose this AM)  - f/u further endocrine recs  - outpt endocrine followup

## 2020-02-09 NOTE — PROGRESS NOTE ADULT - SUBJECTIVE AND OBJECTIVE BOX
Follow-up on: Hypocalcemia     Subjective: Patient seen at bedside, now has flu positive. Currently does not report any barbie-oral numbness or tingling in extremities.     MEDICATIONS  (STANDING):  calcitriol   Capsule 1 MICROGram(s) Oral every 12 hours  calcium carbonate   1250 mG (OsCal) 4 Tablet(s) Oral <User Schedule>  ergocalciferol 10022 Unit(s) Oral every week  heparin  Injectable 5000 Unit(s) SubCutaneous every 12 hours  hydrochlorothiazide 25 milliGRAM(s) Oral daily  levothyroxine 150 MICROGram(s) Oral <User Schedule>  lisinopril 20 milliGRAM(s) Oral daily  magnesium oxide 400 milliGRAM(s) Oral three times a day with meals  oseltamivir 75 milliGRAM(s) Oral two times a day  polyethylene glycol 3350 17 Gram(s) Oral daily  senna 2 Tablet(s) Oral at bedtime    MEDICATIONS  (PRN):  acetaminophen   Tablet .. 650 milliGRAM(s) Oral every 6 hours PRN Mild Pain (1 - 3)  guaiFENesin  milliGRAM(s) Oral every 12 hours PRN congestion  melatonin 3 milliGRAM(s) Oral at bedtime PRN Insomnia  oxyCODONE    IR 10 milliGRAM(s) Oral every 6 hours PRN Severe Pain (7 - 10)  oxyCODONE    IR 5 milliGRAM(s) Oral every 6 hours PRN Moderate Pain (4 - 6)      PHYSICAL EXAM:  VITALS: T(C): 38.2 (02-09-20 @ 09:24)  T(F): 100.8 (02-09-20 @ 09:24), Max: 100.8 (02-09-20 @ 09:24)  HR: 76 (02-09-20 @ 09:24) (70 - 85)  BP: 109/60 (02-09-20 @ 09:24) (107/67 - 120/68)  RR:  (16 - 20)  SpO2:  (96% - 100%)  Wt(kg): --  GENERAL: NAD, well-groomed, well-developed  EYES: No proptosis, no injection  HEENT:  Atraumatic, Normocephalic, moist mucous membranes  Neuro: AAO x 3  Psych: reactive affect, euthymic mood        02-09    130<L>  |  94<L>  |  18  ----------------------------<  104<H>  4.3   |  25  |  1.11    EGFR if : 86  EGFR if non : 74    Ca    7.4<L>      02-09  Mg     2.0     02-09  Phos  3.8     02-09    TPro  x   /  Alb  3.8  /  TBili  x   /  DBili  x   /  AST  x   /  ALT  x   /  AlkPhos  x   02-09

## 2020-02-09 NOTE — PROVIDER CONTACT NOTE (OTHER) - ASSESSMENT
VS stable except temp 100.8. Asymptomatic. No s&s of distress noted. Positive for Flu B on RVP from 2/8.

## 2020-02-09 NOTE — PROGRESS NOTE ADULT - PROBLEM SELECTOR PLAN 9
Transitions of Care Status:  1.  Name of PCP: Dr Joshi [phone]  2.  PCP Contacted on Admission: [ ] Y    [x ] N    3.  PCP contacted at Discharge: [ ] Y    [ ] N    [ ] N/A  4.  Post-Discharge Appointment Date and Location:  5.  Summary of Handoff given to PCP:

## 2020-02-09 NOTE — PROGRESS NOTE ADULT - PROBLEM SELECTOR PLAN 3
- s/p Total Thyroidectomy with modified neck dissection levels II, III, and IV on 1/31/20 for 6 m PTC with LN involvement.  - Path result noted. High risk. Will consider KATE treatment as outpatient.   - Continue with levothyroxine 150 mcg qd, as mentioned above.  Patient to follow up with [facility]  Dr. Kylee Reinoso DO, [address] [place] [address].   Appointment scheduled for 2/24/20 at 11 am.   [phone].

## 2020-02-09 NOTE — PROGRESS NOTE ADULT - PROBLEM SELECTOR PLAN 4
- wound care/pain control and bowel regimen as per ENT  - pathology results noted, to consider KATE treatment as outpatient  - to followup with Endocrine at:  [facility]  Dr. Kylee Reinoso DO, [address] [place] [address].   Appointment scheduled for 2/24/20 at 11 am.   [phone].

## 2020-02-09 NOTE — PROGRESS NOTE ADULT - SUBJECTIVE AND OBJECTIVE BOX
Patient seen and examined at bedside. No acute events overnight. Febrile to 101.2 yesterday am. RVP sent, positive for flu.  No perioral numbness or tingling. No numbness or tingling in fingertips or toes. Discussed with endocrine- phos wnl yesterday, increased calcitriol to 1bid. Received 1g Calcium gluconate for Ca< 7.     Ca 6.9-->7.3-->7.1 (corrected)  Alb 3.9    Well appearing, NAD  Breathing comfortably on RA, no stridor, no stertor  NC clear anteriorly, no epistaxis  OC mucosa pink and moist,   Neck with horizontal incision, c/d/i, steristrips in place, mild barbie-incisional edema, no hematoma, no crepitus, no skin changes, tender to palpation      A/P: 55yo M with PMH thyroid cancer s/p total thyroidectomy, bl SND, central neck dissection 1/31, c/b chyle leak.   - q8h calciums until stable  - Regular diet  - continue calcium 5000QID, rocal .5BID  - levothyroxine  - Tamiflu x 5 days   - pain control  - bowel regimen  - IS  - OOB  - SQH for DVT ppx

## 2020-02-09 NOTE — PROGRESS NOTE ADULT - ASSESSMENT
55 yo M with HTN, recently diagnosed papillary thyroid cancer s/p total thyroidectomy, with neck dissection by ENT on 1/31, course c/b continued hypocalcemia.

## 2020-02-09 NOTE — PROGRESS NOTE ADULT - PROBLEM SELECTOR PLAN 6
Found to be flu positive as above likely explaining URI symptoms with cough, mgmt as above, continue to monitor

## 2020-02-09 NOTE — PROGRESS NOTE ADULT - SUBJECTIVE AND OBJECTIVE BOX
[facility]  Robb Celaya MD  Pager #[phone]    Patient is a 54y old  Male who presents with a chief complaint of Total thyroidectomy (05 Feb 2020 15:09)    INTERVAL HPI/OVERNIGHT EVENTS: No acute events. Found to be flu positive, placed on isolated precautions, started tamiflu.   Pt still with mild runny nose/sore throat, no headache, nausea, vomiting, chest pain, shortness of breath.    MEDICATIONS  (STANDING):  calcitriol   Capsule 1 MICROGram(s) Oral every 12 hours  calcium carbonate   1250 mG (OsCal) 4 Tablet(s) Oral <User Schedule>  ergocalciferol 85393 Unit(s) Oral every week  heparin  Injectable 5000 Unit(s) SubCutaneous every 12 hours  hydrochlorothiazide 25 milliGRAM(s) Oral daily  levothyroxine 150 MICROGram(s) Oral <User Schedule>  lisinopril 20 milliGRAM(s) Oral daily  magnesium oxide 400 milliGRAM(s) Oral three times a day with meals  oseltamivir 75 milliGRAM(s) Oral two times a day  polyethylene glycol 3350 17 Gram(s) Oral daily  senna 2 Tablet(s) Oral at bedtime    MEDICATIONS  (PRN):  acetaminophen   Tablet .. 650 milliGRAM(s) Oral every 6 hours PRN Mild Pain (1 - 3)  guaiFENesin  milliGRAM(s) Oral every 12 hours PRN congestion  melatonin 3 milliGRAM(s) Oral at bedtime PRN Insomnia  oxyCODONE    IR 10 milliGRAM(s) Oral every 6 hours PRN Severe Pain (7 - 10)  oxyCODONE    IR 5 milliGRAM(s) Oral every 6 hours PRN Moderate Pain (4 - 6)    Vital Signs Last 24 Hrs  T(C): 37.7 (09 Feb 2020 14:12), Max: 38.2 (09 Feb 2020 09:24)  T(F): 99.8 (09 Feb 2020 14:12), Max: 100.8 (09 Feb 2020 09:24)  HR: 71 (09 Feb 2020 14:12) (70 - 81)  BP: 118/65 (09 Feb 2020 14:12) (107/67 - 120/68)  BP(mean): --  RR: 18 (09 Feb 2020 14:12) (16 - 20)  SpO2: 99% (09 Feb 2020 14:12) (98% - 100%)    I&O's Detail    08 Feb 2020 07:01  -  09 Feb 2020 07:00  --------------------------------------------------------  IN:  Total IN: 0 mL    OUT:    Voided: 200 mL  Total OUT: 200 mL    Total NET: -200 mL    09 Feb 2020 07:01  -  09 Feb 2020 15:55  --------------------------------------------------------  IN:    Oral Fluid: 240 mL  Total IN: 240 mL    OUT:    Estimated Blood Loss: 1 mL  Total OUT: 1 mL    Total NET: 239 mL    CAPILLARY BLOOD GLUCOSE    PHYSICAL EXAM:  GENERAL: NAD, sitting up in bed  HEAD:  NC/AT  EYES: EOMI, clear sclera/conjunctiva  ENMT: MMM  NECK: horizontal neck incision c/d/i, no erythema or drainage  NERVOUS SYSTEM: non-focal, moving all extremities  CHEST/LUNG: CTAB, no crackles or wheezing   HEART: rrr, nl s1,s2, no mrg  ABDOMEN: soft, non-tender, nondistended, +BS  EXTREMITIES:  no significant edema, warm  SKIN: neck incision, no other lesions or rashes appreciated    LABS:                         12.1   6.75  )-----------( 263      ( 09 Feb 2020 06:57 )             34.8     02-09    130<L>  |  94<L>  |  18  ----------------------------<  104<H>  4.3   |  25  |  1.11    Ca    7.6<L>      09 Feb 2020 14:26  Phos  3.8     02-09  Mg     2.0     02-09    TPro  x   /  Alb  3.8  /  TBili  x   /  DBili  x   /  AST  x   /  ALT  x   /  AlkPhos  x   02-09    RADIOLOGY, EKG & ADDITIONAL TESTS: Reviewed.     Imaging Personally Reviewed:  [x] YES   < from: Xray Chest 1 View- PORTABLE-Urgent (02.06.20 @ 22:15) >  IMPRESSION:    Linear opacity at the right base is indeterminant and may represent atelectasis or pleural thickening/calcification. The lungs are otherwise clear. No pleural effusion or pneumothorax.    The cardiomediastinal silhouette is normal in size and contour. Old fracture deformity of right lower ribs.    < end of copied text >

## 2020-02-09 NOTE — PROGRESS NOTE ADULT - PROBLEM SELECTOR PLAN 1
- Secondary to post op hypoparathyroidism, intact PTH post op 4.49.   - Most recent calcium level noted to be 7.4; goal is 8-8.5   - Patient is currently not symptomatic   - Continue calcium carbonate 1250 mg -->4 tabs 4 times a day, to be given with meals and at bedtime and apart from LT4  - Continue calcitriol --> dose increased yesterday to 1 mcg bid. May consider phosphate binder if needed.  - Vitamin D 25 OH noted to be low 16.8, continue Ergocalciferol 50,000 units weekly for at least 8 weeks.  - Vitamin D 1,25-di OH levels low normal 29.1  - Monitor serum calcium q8h along with daily albumin, Mg and phos  - Mg today noted to be 2.0. Continue Mg oxide 400 mg tid with meals  - Can give prn calcium gluconate if serum Ca <7 or patient is symptomatic.  - Outpatient endocrine follow up on discharge.

## 2020-02-09 NOTE — PROGRESS NOTE ADULT - PROBLEM SELECTOR PLAN 1
Febrile, URI symptoms, RSV returned flu b+  - started on oseltamivir x 5 days  - monitor vitals  - supportive care

## 2020-02-10 ENCOUNTER — TRANSCRIPTION ENCOUNTER (OUTPATIENT)
Age: 55
End: 2020-02-10

## 2020-02-10 VITALS
RESPIRATION RATE: 17 BRPM | DIASTOLIC BLOOD PRESSURE: 57 MMHG | TEMPERATURE: 99 F | SYSTOLIC BLOOD PRESSURE: 116 MMHG | HEART RATE: 72 BPM | OXYGEN SATURATION: 100 %

## 2020-02-10 DIAGNOSIS — E87.1 HYPO-OSMOLALITY AND HYPONATREMIA: ICD-10-CM

## 2020-02-10 LAB
ALBUMIN SERPL ELPH-MCNC: 3.6 G/DL — SIGNIFICANT CHANGE UP (ref 3.3–5)
ANION GAP SERPL CALC-SCNC: 12 MMO/L — SIGNIFICANT CHANGE UP (ref 7–14)
BUN SERPL-MCNC: 18 MG/DL — SIGNIFICANT CHANGE UP (ref 7–23)
CALCIUM SERPL-MCNC: 7.7 MG/DL — LOW (ref 8.4–10.5)
CHLORIDE SERPL-SCNC: 93 MMOL/L — LOW (ref 98–107)
CO2 SERPL-SCNC: 27 MMOL/L — SIGNIFICANT CHANGE UP (ref 22–31)
CREAT SERPL-MCNC: 1.15 MG/DL — SIGNIFICANT CHANGE UP (ref 0.5–1.3)
GLUCOSE SERPL-MCNC: 91 MG/DL — SIGNIFICANT CHANGE UP (ref 70–99)
MAGNESIUM SERPL-MCNC: 2 MG/DL — SIGNIFICANT CHANGE UP (ref 1.6–2.6)
OSMOLALITY UR: 457 MOSMO/KG — SIGNIFICANT CHANGE UP (ref 50–1200)
PHOSPHATE SERPL-MCNC: 4.1 MG/DL — SIGNIFICANT CHANGE UP (ref 2.5–4.5)
POTASSIUM SERPL-MCNC: 4.5 MMOL/L — SIGNIFICANT CHANGE UP (ref 3.5–5.3)
POTASSIUM SERPL-SCNC: 4.5 MMOL/L — SIGNIFICANT CHANGE UP (ref 3.5–5.3)
SODIUM SERPL-SCNC: 132 MMOL/L — LOW (ref 135–145)
SODIUM UR-SCNC: 27 MMOL/L — SIGNIFICANT CHANGE UP

## 2020-02-10 PROCEDURE — 99239 HOSP IP/OBS DSCHRG MGMT >30: CPT

## 2020-02-10 PROCEDURE — 99233 SBSQ HOSP IP/OBS HIGH 50: CPT

## 2020-02-10 RX ORDER — LEVOTHYROXINE SODIUM 125 MCG
1 TABLET ORAL
Qty: 30 | Refills: 0
Start: 2020-02-10 | End: 2020-03-10

## 2020-02-10 RX ORDER — CALCITRIOL 0.5 UG/1
2 CAPSULE ORAL
Qty: 60 | Refills: 0
Start: 2020-02-10 | End: 2020-02-24

## 2020-02-10 RX ORDER — OXYCODONE HYDROCHLORIDE 5 MG/1
1 TABLET ORAL
Qty: 8 | Refills: 0
Start: 2020-02-10 | End: 2020-02-11

## 2020-02-10 RX ORDER — CALCIUM CARBONATE 500(1250)
4 TABLET ORAL
Qty: 240 | Refills: 0
Start: 2020-02-10 | End: 2020-02-24

## 2020-02-10 RX ORDER — ACETAMINOPHEN 500 MG
2 TABLET ORAL
Qty: 32 | Refills: 0
Start: 2020-02-10 | End: 2020-02-13

## 2020-02-10 RX ORDER — ERGOCALCIFEROL 1.25 MG/1
1 CAPSULE ORAL
Qty: 7 | Refills: 0
Start: 2020-02-10 | End: 2020-03-10

## 2020-02-10 RX ADMIN — MAGNESIUM OXIDE 400 MG ORAL TABLET 400 MILLIGRAM(S): 241.3 TABLET ORAL at 16:19

## 2020-02-10 RX ADMIN — Medication 75 MILLIGRAM(S): at 06:53

## 2020-02-10 RX ADMIN — Medication 150 MICROGRAM(S): at 04:58

## 2020-02-10 RX ADMIN — MAGNESIUM OXIDE 400 MG ORAL TABLET 400 MILLIGRAM(S): 241.3 TABLET ORAL at 07:54

## 2020-02-10 RX ADMIN — Medication 4 TABLET(S): at 12:50

## 2020-02-10 RX ADMIN — CALCITRIOL 1 MICROGRAM(S): 0.5 CAPSULE ORAL at 06:53

## 2020-02-10 RX ADMIN — Medication 4 TABLET(S): at 07:54

## 2020-02-10 RX ADMIN — MAGNESIUM OXIDE 400 MG ORAL TABLET 400 MILLIGRAM(S): 241.3 TABLET ORAL at 12:50

## 2020-02-10 NOTE — DISCHARGE NOTE NURSING/CASE MANAGEMENT/SOCIAL WORK - NSDCPNINST_GEN_ALL_CORE
Call MD for any c/o redness, drainage to anterior transverse incision with steri strips, fever, nausea, vomit, difficulty swallowing and a return appointment.  Take over the counter stool softener to prevent constipation that can be a side effects from taking pain medication.

## 2020-02-10 NOTE — PROGRESS NOTE ADULT - PROBLEM SELECTOR PLAN 3
- c/w synthroid 150 mcg as per endocrine, to be dosed in AM on empty stomach and apart from other meds  - goal TSH <.1 given path result  - repeat TFTs as outpatient in 4 weeks hold HCTZ  check urine osmolality, urine sodium

## 2020-02-10 NOTE — PROGRESS NOTE ADULT - PROBLEM SELECTOR PLAN 1
Febrile, URI symptoms, RSV returned flu b+  - droplet precaution isolation   - started on oseltamivir x 5 days  - monitor vitals  - supportive care

## 2020-02-10 NOTE — PROGRESS NOTE ADULT - ATTENDING COMMENTS
Calcium level remains suboptimal despite high doses of calcium carbonate. Pharmacy does not carry calcium citrate. Will cautiously raise calcitriol dose and observe phos level for worsening hyperphosphatemis. May consider phosphate binder if needed (calcium acetate). Optimize magnesium.  Timing of calcium and levothyroxine appropriate now. Reviewed surgical path with patient. Given large size (6.9cm), + LN spread and tall cell component he would be a candidate for KATE treatment. Outpatient endocrine follow up scheduled for ongoing management.
54M PTC (6cm with + LN) s/p total thyroidectomy, postoperative hypothyroidism and hypoparathyroidism.  Increased levothyroxine for mild TSH suppression target, but follow up pathology. Hypocalcemia - increased calcium carbonate and please give with food to help absorption. Levothyroxine was given 7 minutes apart from calcium today - this will inhibit absorption of levothyroxine. Ideally separate these by 4 hours. Calcitriol dosing limited by hyperphosphatemia. Will need outpatient endocrine follow up.
Uptitrate calcium carbonate as outlined. Cannot increase calcitriol because of mild hyperphosphatemia. Optimize magnesium.  Timing of calcium and levothyroxine appropriate now. Reviewed surgical path with patient. Given large size (6.9cm), + LN spread and tall cell component he would be a candidate for KATE treatment. Outpatient endocrine follow up scheduled for ongoing management.
This is a 54M with HTN, recently diagnosed papillary thyroid cancer s/p total thyroidectomy, with neck dissection by ENT on 1/31, course c/b continued hypocalcemia. Patient with improvement in hypocalcemia today, corrected to 8.0. Discussed with endocrine, Dr. Sandhu, patient is okay to be discharged on current medication regimen and timing of current meds (PO synthroid in AM on empty stomach). He has a follow-up endocrine appointment scheduled for 2/24/20 at 11AM for further management of hypocalcemia and thyroid issues. Patient stable for discharge home today without needs.     37 minutes spent on discharge planning.

## 2020-02-10 NOTE — PROGRESS NOTE ADULT - SUBJECTIVE AND OBJECTIVE BOX
Patient seen and examined at bedside. No acute events overnight. Patient very anxious to go home.      Vital Signs Last 24 Hrs  T(C): 36.8 (10 Feb 2020 04:57), Max: 38.2 (09 Feb 2020 09:24)  T(F): 98.2 (10 Feb 2020 04:57), Max: 100.8 (09 Feb 2020 09:24)  HR: 80 (10 Feb 2020 04:57) (69 - 86)  BP: 113/69 (10 Feb 2020 04:57) (109/60 - 142/66)  BP(mean): --  RR: 17 (10 Feb 2020 04:57) (16 - 18)  SpO2: 98% (10 Feb 2020 04:57) (98% - 100%)  Well appearing, NAD  Breathing comfortably on RA, no stridor, no stertor  NC clear anteriorly, no epistaxis  OC mucosa pink and moist,   Neck with horizontal incision, c/d/i, steristrips in place, mild barbie-incisional edema, no hematoma, no crepitus, no skin changes, tender to palpation    Calcium up to 7.8, 7.8, 7.9(corrected)    Na 132 from 130    A/P: 53yo M with PMH thyroid cancer s/p total thyroidectomy, bl SND, central neck dissection 1/31, c/b chyle leak and hypocalcemia  - q8h calciums until stable  - will remove neck staples  - Regular diet  - continue calcium 5000QID, rocal .5BID  - levothyroxine  - Tamiflu x 5 days   - pain control  - bowel regimen  - IS  - OOB  - SQH for DVT ppx  - plan for discharge today if cleared by medicine and endocrine

## 2020-02-10 NOTE — PROGRESS NOTE ADULT - ASSESSMENT
54 yr old male recently diagnosed with papillary thyroid cancer underwent Total Thyroidectomy with modified neck dissection levels II, III, and IV on 1/31/20.  Endocrine team following for post op hypocalcemia and post op hypothyroidism.   Patient is now stable for discharge from endocrine standpoint.

## 2020-02-10 NOTE — PROGRESS NOTE ADULT - PROBLEM SELECTOR PLAN 5
- on HCTZ and lisinopril  - BP currently in acceptable range  - monitor pressures and adjust regimen as needed - wound care/pain control w oxycodone PRN   - bowel regimen as per ENT  - pathology results noted, to consider KATE treatment as outpatient  - to followup with Endocrine at:  [facility]  Dr. Kylee Reinoso DO, [address] [place] [address].   Appointment scheduled for 2/24/20 at 11 am.   [phone]. - wound care/pain control w oxycodone PRN + bowel regimen   - pathology results noted, to consider KATE treatment as outpatient  - to followup with Endocrine at:  [facility]  Dr. Kylee Reinoso DO, [address] [place] [address].   Appointment scheduled for 2/24/20 at 11 am.   [phone].

## 2020-02-10 NOTE — PROGRESS NOTE ADULT - PROBLEM SELECTOR PLAN 2
2/2 post-op hypoparathyroidism  - Ca slightly uptrending, goal is 8-8.5 as per endocrine before discharge   - c/w calcium supplementation as per endocrine  - c/w calcitriol as per endocrine unable to increase further given hyperphosphatemia  - vitD deficiency, c/w ergocalciferol 50k U weekly x 8 weeks as per endocrine  - optimize Mg status as per endocrine, on Mg oxide 400 mg tid w/ meals  - as per endocrine can give prn calcium gluconate if serum Ca <7 or patient symptomatic   - f/u further endocrine recs  - outpt endocrine followup 2/2 post-op hypoparathyroidism  - Ca slightly uptrending, cleared by endocrine for home discharge today   - c/w calcium supplementation as per endocrine  - c/w calcitriol as per endocrine unable to increase further given hyperphosphatemia  - vitD deficiency, c/w ergocalciferol 50k U weekly x 8 weeks as per endocrine  - optimize Mg status as per endocrine, on Mg oxide 400 mg tid w/ meals  - as per endocrine can give prn calcium gluconate if serum Ca <7 or patient symptomatic   - endocrine supports home discharge today, c/w calcium supplementation.   - outpt endocrine followup

## 2020-02-10 NOTE — PROGRESS NOTE ADULT - PROBLEM SELECTOR PLAN 1
- Secondary to post op hypoparathyroidism, intact PTH post op 4.49.   - Corrected calcium today 8.0 now at goal; goal is 8-8.5   - Patient is not symptomatic   - Continue calcium carbonate 1250 mg -->4 tabs 4 times a day, to be given with meals and at bedtime and apart from LT4  - Continue calcitriol -->  1 mcg bid.   - Vitamin D 25 OH noted to be low 16.8, continue Ergocalciferol 50,000 units weekly for at least 8 weeks.  Can discharge on above regimen.  - Outpatient endocrine follow up on discharge.

## 2020-02-10 NOTE — PROGRESS NOTE ADULT - SUBJECTIVE AND OBJECTIVE BOX
Aditi Triplett  PA Student   Pager [phone]    Patient is a 54y old  Male who presents with a chief complaint of Total thyroidectomy (09 Feb 2020 15:53)      SUBJECTIVE / OVERNIGHT EVENTS: Pt was seen at bedside this morning, still under droplet precautions. Reporting some abdominal pain following Tamiflu doses, reporting minor sore throat and nasal congestion, denies fever/chills, myalgia, headache, cough. Pt reporting minor pain at incision site of thyroidectomy, denies acute worsening. Patient has no acute complaints, is anxious to go home.     ADDITIONAL REVIEW OF SYSTEMS:    MEDICATIONS  (STANDING):  calcitriol   Capsule 1 MICROGram(s) Oral every 12 hours  calcium carbonate   1250 mG (OsCal) 4 Tablet(s) Oral <User Schedule>  ergocalciferol 05063 Unit(s) Oral every week  heparin  Injectable 5000 Unit(s) SubCutaneous every 12 hours  hydrochlorothiazide 25 milliGRAM(s) Oral daily  levothyroxine 150 MICROGram(s) Oral <User Schedule>  lisinopril 20 milliGRAM(s) Oral daily  magnesium oxide 400 milliGRAM(s) Oral three times a day with meals  oseltamivir 75 milliGRAM(s) Oral two times a day  polyethylene glycol 3350 17 Gram(s) Oral daily  senna 2 Tablet(s) Oral at bedtime    MEDICATIONS  (PRN):  acetaminophen   Tablet .. 650 milliGRAM(s) Oral every 6 hours PRN Mild Pain (1 - 3)  guaiFENesin  milliGRAM(s) Oral every 12 hours PRN congestion  melatonin 3 milliGRAM(s) Oral at bedtime PRN Insomnia  oxyCODONE    IR 10 milliGRAM(s) Oral every 6 hours PRN Severe Pain (7 - 10)  oxyCODONE    IR 5 milliGRAM(s) Oral every 6 hours PRN Moderate Pain (4 - 6)      CAPILLARY BLOOD GLUCOSE        I&O's Summary    09 Feb 2020 07:01  -  10 Feb 2020 07:00  --------------------------------------------------------  IN: 240 mL / OUT: 601 mL / NET: -361 mL        PHYSICAL EXAM:    Vital Signs Last 24 Hrs  T(C): 37.4 (10 Feb 2020 09:10), Max: 37.8 (09 Feb 2020 21:10)  T(F): 99.4 (10 Feb 2020 09:10), Max: 100 (09 Feb 2020 21:10)  HR: 72 (10 Feb 2020 09:10) (69 - 86)  BP: 116/57 (10 Feb 2020 09:10) (110/71 - 142/66)  BP(mean): --  RR: 17 (10 Feb 2020 09:10) (16 - 18)  SpO2: 100% (10 Feb 2020 09:10) (98% - 100%)    CONSTITUTIONAL: NAD, well-developed, well-groomed  EYES: PERRLA; conjunctiva and sclera clear  ENMT: Moist oral mucosa, no pharyngeal injection or exudates; normal dentition  NECK: Supple, no palpable masses; no thyromegaly  RESPIRATORY: Normal respiratory effort; lungs are clear to auscultation bilaterally  CARDIOVASCULAR: Regular rate and rhythm, normal S1 and S2, no murmur/rub/gallop; No lower extremity edema; Peripheral pulses are 2+ bilaterally  ABDOMEN: Nontender to palpation, normoactive bowel sounds, no rebound/guarding; No hepatosplenomegaly  MUSCULOSKELETAL:  Normal gait; no clubbing or cyanosis of digits; no joint swelling or tenderness to palpation  PSYCH: A+O to person, place, and time; affect appropriate  NEUROLOGY: CN 2-12 are intact and symmetric; no gross sensory deficits   SKIN: No rashes; no palpable lesions    LABS:                        12.1   6.75  )-----------( 263      ( 09 Feb 2020 06:57 )             34.8     02-10    132<L>  |  93<L>  |  18  ----------------------------<  91  4.5   |  27  |  1.15    Ca    7.7<L>      10 Feb 2020 05:04  Phos  4.1     02-10  Mg     2.0     02-10    TPro  x   /  Alb  3.6  /  TBili  x   /  DBili  x   /  AST  x   /  ALT  x   /  AlkPhos  x   02-10      RADIOLOGY & ADDITIONAL TESTS:  < from: MR Orbit, Face, and/or Neck w/Cont, Bilat (01.10.20 @ 09:20) >    IMPRESSION:  Cystic mass arising from the upper pole of the right thyroid lobe with an enhancing mural nodule in keeping with the patient's history of papillary thyroid cancer.    Multiple nodules/septations are seen within the thyroid gland adjacent to the mass which may represent adjacent thyroid nodules or a component of the primary neoplasm.    Enlarged bilateral level II and left level III lymph nodes, suspicious for olga metastases, particularly the left level III node.    Thyroid nodule versus right level VI olga metastasis.      < from: US Fine Needle Aspiration 1st Lesion (12.24.19 @ 12:43) >  IMPRESSION:    Ultrasound-guided fine-needle aspiration biopsy of an abnormal appearing right lobe thyroid gland with a pedunculated nodule. In addition, 65 cc of adjacent serosanguineous fluid was aspirated and sent for thyroglobulin testing.    Specimen sent for cytologic evaluation at [[facility].  ( Phone # [phone] )] [    Please note that there are bilateral abnormal appearing cervical lymph nodes as described above for which neoplastic involvement should be considered.    < from: Xray Chest 1 View- PORTABLE-Urgent (02.06.20 @ 22:15) >  FINDINGS/  IMPRESSION:    Linear opacity at the right base is indeterminant and may represent atelectasis or pleural thickening/calcification. The lungs are otherwise clear. No pleural effusion or pneumothorax.    The cardiomediastinal silhouette is normal in size and contour. Old fracture deformity of right lower ribs.      Results Reviewed: y  Imaging Personally Reviewed:  Electrocardiogram Personally Reviewed:    COORDINATION OF CARE: otolaryngology, endocrine    Care Discussed with Consultants/Other Providers [Y/N]:  Prior or Outpatient Records Reviewed [Y/N]: Aditi MARTIN Student   Pager [phone]    Patient is a 54y old  Male who presents with a chief complaint of Total thyroidectomy (09 Feb 2020 15:53)      SUBJECTIVE / OVERNIGHT EVENTS: Pt was seen at bedside this morning, still under droplet precautions. Reporting some abdominal pain following Tamiflu doses, reporting minor sore throat and nasal congestion, denies fever/chills, myalgia, headache, cough. Pt reporting minor pain at incision site of thyroidectomy, denies acute worsening. Patient has no acute complaints, is anxious to go home.       MEDICATIONS  (STANDING):  calcitriol   Capsule 1 MICROGram(s) Oral every 12 hours  calcium carbonate   1250 mG (OsCal) 4 Tablet(s) Oral <User Schedule>  ergocalciferol 57131 Unit(s) Oral every week  heparin  Injectable 5000 Unit(s) SubCutaneous every 12 hours  hydrochlorothiazide 25 milliGRAM(s) Oral daily  levothyroxine 150 MICROGram(s) Oral <User Schedule>  lisinopril 20 milliGRAM(s) Oral daily  magnesium oxide 400 milliGRAM(s) Oral three times a day with meals  oseltamivir 75 milliGRAM(s) Oral two times a day  polyethylene glycol 3350 17 Gram(s) Oral daily  senna 2 Tablet(s) Oral at bedtime    MEDICATIONS  (PRN):  acetaminophen   Tablet .. 650 milliGRAM(s) Oral every 6 hours PRN Mild Pain (1 - 3)  guaiFENesin  milliGRAM(s) Oral every 12 hours PRN congestion  melatonin 3 milliGRAM(s) Oral at bedtime PRN Insomnia  oxyCODONE    IR 10 milliGRAM(s) Oral every 6 hours PRN Severe Pain (7 - 10)  oxyCODONE    IR 5 milliGRAM(s) Oral every 6 hours PRN Moderate Pain (4 - 6)      CAPILLARY BLOOD GLUCOSE        I&O's Summary    09 Feb 2020 07:01  -  10 Feb 2020 07:00  --------------------------------------------------------  IN: 240 mL / OUT: 601 mL / NET: -361 mL        PHYSICAL EXAM:    Vital Signs Last 24 Hrs  T(C): 37.4 (10 Feb 2020 09:10), Max: 37.8 (09 Feb 2020 21:10)  T(F): 99.4 (10 Feb 2020 09:10), Max: 100 (09 Feb 2020 21:10)  HR: 72 (10 Feb 2020 09:10) (69 - 86)  BP: 116/57 (10 Feb 2020 09:10) (110/71 - 142/66)  BP(mean): --  RR: 17 (10 Feb 2020 09:10) (16 - 18)  SpO2: 100% (10 Feb 2020 09:10) (98% - 100%)    CONSTITUTIONAL: NAD, well-developed, well-groomed  EYES: PERRLA; conjunctiva and sclera clear  ENMT: Moist oral mucosa  NECK: incision site c/d/i, steristrips in place  RESPIRATORY: Normal respiratory effort; lungs are clear to auscultation bilaterally  CARDIOVASCULAR: Regular rate and rhythm, normal S1 and S2, no murmur/rub/gallop; No lower extremity edema; Peripheral pulses are 2+ bilaterally  ABDOMEN: Nontender to palpation, normoactive bowel sounds, no rebound/guarding; No hepatosplenomegaly  PSYCH: A+O to person, place, and time; affect appropriate  NEUROLOGY: CN 2-12 are intact and symmetric; no gross sensory deficits   SKIN: No rashes; no palpable lesions    LABS:                        12.1   6.75  )-----------( 263      ( 09 Feb 2020 06:57 )             34.8     02-10    132<L>  |  93<L>  |  18  ----------------------------<  91  4.5   |  27  |  1.15    Ca    7.7<L>      10 Feb 2020 05:04  Phos  4.1     02-10  Mg     2.0     02-10    TPro  x   /  Alb  3.6  /  TBili  x   /  DBili  x   /  AST  x   /  ALT  x   /  AlkPhos  x   02-10      RADIOLOGY & ADDITIONAL TESTS:  < from: MR Orbit, Face, and/or Neck w/Cont, Bilat (01.10.20 @ 09:20) >    IMPRESSION:  Cystic mass arising from the upper pole of the right thyroid lobe with an enhancing mural nodule in keeping with the patient's history of papillary thyroid cancer.    Multiple nodules/septations are seen within the thyroid gland adjacent to the mass which may represent adjacent thyroid nodules or a component of the primary neoplasm.    Enlarged bilateral level II and left level III lymph nodes, suspicious for olga metastases, particularly the left level III node.    Thyroid nodule versus right level VI olga metastasis.      < from: US Fine Needle Aspiration 1st Lesion (12.24.19 @ 12:43) >  IMPRESSION:    Ultrasound-guided fine-needle aspiration biopsy of an abnormal appearing right lobe thyroid gland with a pedunculated nodule. In addition, 65 cc of adjacent serosanguineous fluid was aspirated and sent for thyroglobulin testing.    Specimen sent for cytologic evaluation at [[facility].  ( Phone # [phone] )] [    Please note that there are bilateral abnormal appearing cervical lymph nodes as described above for which neoplastic involvement should be considered.    < from: Xray Chest 1 View- PORTABLE-Urgent (02.06.20 @ 22:15) >  FINDINGS/  IMPRESSION:    Linear opacity at the right base is indeterminant and may represent atelectasis or pleural thickening/calcification. The lungs are otherwise clear. No pleural effusion or pneumothorax.    The cardiomediastinal silhouette is normal in size and contour. Old fracture deformity of right lower ribs.      Results Reviewed: y  Imaging Personally Reviewed:  Electrocardiogram Personally Reviewed:    COORDINATION OF CARE: otolaryngology, endocrine    Care Discussed with Consultants/Other Providers [Y/N]: Y, Dr. Sandhu, corrected Ca = 8.0, patient stable for d/c home with current supplement regimen and outpatient endo f/u.   Prior or Outpatient Records Reviewed [Y/N]:

## 2020-02-10 NOTE — PROGRESS NOTE ADULT - PROBLEM SELECTOR PLAN 6
heparin SC BID   ambulate as tolerated - on HCTZ and lisinopril  - BP currently in acceptable range  - monitor pressures and adjust regimen as needed - Home meds: HCTZ and lisinopril.   - D/c HCTZ due to hyponatremia.   - BP currently in acceptable range

## 2020-02-10 NOTE — PROGRESS NOTE ADULT - PROBLEM SELECTOR PLAN 8
Transitions of Care Status:  1.  Name of PCP: Dr Joshi [phone]  2.  PCP Contacted on Admission: [ ] Y    [x ] N    3.  PCP contacted at Discharge: [ ] Y    [ ] N    [ ] N/A  4.  Post-Discharge Appointment Date and Location:  5.  Summary of Handoff given to PCP: Transitions of Care Status:  1.  Name of PCP: Dr Joshi [phone]  2.  PCP Contacted on Admission: [ ] Y    [x ] N    3.  PCP contacted at Discharge: [ ] Y    [ ] N    [ ] N/A  4.  Post-Discharge Appointment Date and Location:  [facility]  Dr. Kylee Reinoso , [address] [place] [address].   Appointment scheduled for 2/24/20 at 11 am.   Cleared for discharge, no rehab or home therapy needed. Transitions of Care Status:  1.  Name of PCP: Dr Joshi [phone]  2.  PCP Contacted on Admission: [ ] Y    [x ] N  attempted to call, no answer, not able to leave message   3.  PCP contacted at Discharge: [ ] Y    [ ] N    [ ] N/A  4.  Post-Discharge Appointment Date and Location:  [facility]  Dr. Kylee Reinoso DO, [address] [place] [address].   Appointment scheduled for 2/24/20 at 11 am.   Cleared for discharge, no rehab or home therapy needed. Transitions of Care Status:  1.  Name of PCP: Dr Joshi [phone]  2.  PCP Contacted on Admission: [ ] Y    [x ] N  attempted to call, no answer, not able to leave message   3.  PCP contacted at Discharge: [ ] Y    [ X] N    [ ] N/A attempted to call, no answer, not able to leave message   4.  Post-Discharge Appointment Date and Location:  [facility]  Dr. Kylee Reinoso DO, [address] [place] [address].   Appointment scheduled for 2/24/20 at 11 am.   Cleared for discharge, no rehab or home therapy needed.

## 2020-02-10 NOTE — DISCHARGE NOTE NURSING/CASE MANAGEMENT/SOCIAL WORK - PATIENT PORTAL LINK FT
You can access the FollowMyHealth Patient Portal offered by [facility] by registering at the following website: [email or web address]. By joining [facility]’s FollowMyHealth portal, you will also be able to view your health information using other applications (apps) compatible with our system.

## 2020-02-10 NOTE — PROGRESS NOTE ADULT - SUBJECTIVE AND OBJECTIVE BOX
Chief Complaint: Hypothyroidism, hypoparathyroidism    History: Feeling well. Tolerating po.  No perioral numbness.  For dc home today.    MEDICATIONS  (STANDING):  calcitriol   Capsule 1 MICROGram(s) Oral every 12 hours  calcium carbonate   1250 mG (OsCal) 4 Tablet(s) Oral <User Schedule>  ergocalciferol 87528 Unit(s) Oral every week  heparin  Injectable 5000 Unit(s) SubCutaneous every 12 hours  levothyroxine 150 MICROGram(s) Oral <User Schedule>  lisinopril 20 milliGRAM(s) Oral daily  magnesium oxide 400 milliGRAM(s) Oral three times a day with meals  oseltamivir 75 milliGRAM(s) Oral two times a day  polyethylene glycol 3350 17 Gram(s) Oral daily  senna 2 Tablet(s) Oral at bedtime    MEDICATIONS  (PRN):  acetaminophen   Tablet .. 650 milliGRAM(s) Oral every 6 hours PRN Mild Pain (1 - 3)  guaiFENesin  milliGRAM(s) Oral every 12 hours PRN congestion  melatonin 3 milliGRAM(s) Oral at bedtime PRN Insomnia  oxyCODONE    IR 10 milliGRAM(s) Oral every 6 hours PRN Severe Pain (7 - 10)  oxyCODONE    IR 5 milliGRAM(s) Oral every 6 hours PRN Moderate Pain (4 - 6)      Allergies    No Known Allergies    Intolerances      Review of Systems:      ALL OTHER SYSTEMS REVIEWED AND NEGATIVE        PHYSICAL EXAM:  VITALS: T(C): 37.4 (02-10-20 @ 09:10)  T(F): 99.4 (02-10-20 @ 09:10), Max: 100 (02-09-20 @ 21:10)  HR: 72 (02-10-20 @ 09:10) (69 - 86)  BP: 116/57 (02-10-20 @ 09:10) (110/71 - 142/66)  RR:  (16 - 18)  SpO2:  (98% - 100%)  Wt(kg): --  GENERAL: NAD, well-groomed, well-developed  EYES: No proptosis, no lid lag, anicteric  HEENT:  Atraumatic, Normocephalic, moist mucous membranes  THYROID: neck incision intact  RESPIRATORY: nonlabored respirations  PSYCH: Alert and oriented x 3, normal affect, normal mood      CAPILLARY BLOOD GLUCOSE          02-10    132<L>  |  93<L>  |  18  ----------------------------<  91  4.5   |  27  |  1.15    EGFR if : 82  EGFR if non : 71    Ca    7.7<L>      02-10  Mg     2.0     02-10  Phos  4.1     02-10    TPro  x   /  Alb  3.6  /  TBili  x   /  DBili  x   /  AST  x   /  ALT  x   /  AlkPhos  x   02-10          Thyroid Function Tests:

## 2020-02-10 NOTE — DISCHARGE NOTE NURSING/CASE MANAGEMENT/SOCIAL WORK - NSDCFUADDAPPT_GEN_ALL_CORE_FT
ENDOCRINOLOGY: Dr. Kylee Reinoso DO  [address] [place], [place] [id number]  Appointment scheduled for 2/24/20 at 11 am.   [phone].

## 2020-02-10 NOTE — PROGRESS NOTE ADULT - PROBLEM SELECTOR PLAN 7
Transitions of Care Status:  1.  Name of PCP: Dr Joshi [phone]  2.  PCP Contacted on Admission: [ ] Y    [x ] N    3.  PCP contacted at Discharge: [ ] Y    [ ] N    [ ] N/A  4.  Post-Discharge Appointment Date and Location:  5.  Summary of Handoff given to PCP: heparin SC BID   ambulate as tolerated

## 2020-02-10 NOTE — PROGRESS NOTE ADULT - PROBLEM SELECTOR PLAN 3
- s/p Total Thyroidectomy with modified neck dissection levels II, III, and IV on 1/31/20 for 6 m PTC with LN involvement.  - Path result noted. High risk. Will consider KATE treatment as outpatient.   - Continue with levothyroxine 150 mcg qd, as mentioned above.  Patient to follow up with [facility]  Dr. Kylee Reinoso DO, [address] [place] [address].   Appointment scheduled for 2/24/20 at 11 am.

## 2020-02-10 NOTE — PROGRESS NOTE ADULT - PROBLEM SELECTOR PROBLEM 2
Postoperative hypothyroidism
Hypocalcemia
Hypocalcemia
Postoperative hypothyroidism

## 2020-02-10 NOTE — PROGRESS NOTE ADULT - PROBLEM SELECTOR PLAN 2
- Continue with levothyroxine 150 mcg qd, to be given in on empty stomach, wait at least half an hr before eating or taking other meds and should be 4 hrs apart from calcium or MVI   - Check TFTs as outpatient in 4 weeks   - Goal TSH <0.1 based on path result

## 2020-02-10 NOTE — PROGRESS NOTE ADULT - PROBLEM SELECTOR PLAN 4
- wound care/pain control w oxycodone PRN   - bowel regimen as per ENT  - pathology results noted, to consider KATE treatment as outpatient  - to followup with Endocrine at:  [facility]  Dr. Kylee Reinoso DO, [address] [place] [address].   Appointment scheduled for 2/24/20 at 11 am.   [phone]. - c/w synthroid 150 mcg as per endocrine, to be dosed in AM on empty stomach and apart from other meds  - goal TSH <.1 given path result  - repeat TFTs as outpatient in 4 weeks

## 2020-02-11 LAB — BACTERIA BLD CULT: SIGNIFICANT CHANGE UP

## 2020-02-12 LAB — BACTERIA BLD CULT: SIGNIFICANT CHANGE UP

## 2020-02-18 ENCOUNTER — APPOINTMENT (OUTPATIENT)
Dept: OTOLARYNGOLOGY | Facility: CLINIC | Age: 55
End: 2020-02-18
Payer: MEDICAID

## 2020-02-18 VITALS
BODY MASS INDEX: 30.05 KG/M2 | HEIGHT: 64 IN | WEIGHT: 176 LBS | HEART RATE: 80 BPM | DIASTOLIC BLOOD PRESSURE: 95 MMHG | SYSTOLIC BLOOD PRESSURE: 154 MMHG

## 2020-02-18 PROCEDURE — 99024 POSTOP FOLLOW-UP VISIT: CPT

## 2020-02-18 NOTE — PHYSICAL EXAM
[de-identified] : Wound healing well. No erythema or edema. [Midline] : trachea located in midline position [Normal] : no rashes

## 2020-02-18 NOTE — ASSESSMENT
[FreeTextEntry1] : Path d/w pt.  Pt to proceed with KATE treatment.\par \par Pt to f/u with Endocrinology on 2/24/20 for thyroid and calcium level management.

## 2020-02-18 NOTE — CONSULT LETTER
[Dear  ___] : Dear  [unfilled], [Courtesy Letter:] : I had the pleasure of seeing your patient, [unfilled], in my office today. [Please see my note below.] : Please see my note below. [Sincerely,] : Sincerely, [Consult Closing:] : Thank you very much for allowing me to participate in the care of this patient.  If you have any questions, please do not hesitate to contact me. [DrManny  ___] : Dr. RAY [FreeTextEntry3] : Rahul Boykin MD, FACS\par \par Dept. of Otolaryngology and Head & Neck Surgery\par [facility]\par  [FreeTextEntry2] : Kiya Joshi, DO

## 2020-02-18 NOTE — HISTORY OF PRESENT ILLNESS
[de-identified] : 54 year old male follow up s/p Total thyroidectomy with bilateral modified neck dissection levels II, III, and IV, 01/31/2020. patient reports pain at incision site and chest, took the oxycodone with relief, now taking acetaminophen with some temporary relief.  Denies bleeding, dyspnea, dysphagia, odynophagia. Reports decrease in appetite. Pt with hypocalcemic symptoms.

## 2020-02-24 ENCOUNTER — APPOINTMENT (OUTPATIENT)
Dept: ENDOCRINOLOGY | Facility: CLINIC | Age: 55
End: 2020-02-24
Payer: COMMERCIAL

## 2020-02-24 VITALS
BODY MASS INDEX: 29.88 KG/M2 | SYSTOLIC BLOOD PRESSURE: 151 MMHG | HEART RATE: 75 BPM | OXYGEN SATURATION: 99 % | DIASTOLIC BLOOD PRESSURE: 98 MMHG | WEIGHT: 175 LBS | HEIGHT: 64 IN

## 2020-02-24 DIAGNOSIS — Z78.9 OTHER SPECIFIED HEALTH STATUS: ICD-10-CM

## 2020-02-24 DIAGNOSIS — Z83.3 FAMILY HISTORY OF DIABETES MELLITUS: ICD-10-CM

## 2020-02-24 PROCEDURE — 36415 COLL VENOUS BLD VENIPUNCTURE: CPT

## 2020-02-24 PROCEDURE — 99204 OFFICE O/P NEW MOD 45 MIN: CPT | Mod: 25

## 2020-02-24 PROCEDURE — 99214 OFFICE O/P EST MOD 30 MIN: CPT | Mod: 25

## 2020-02-24 NOTE — HISTORY OF PRESENT ILLNESS
[FreeTextEntry1] : Mr. INOCENCIA THRASHER is a 54 year old male with a past medical history of PTC s/p total thyroidectomy w/ b/l modified neck dissection cb hypocalcemia. In December 2019, he noticed a lump on his neck. He went to  and he eventually had an US and a CT scan done. He then established with Dr Boykin . He had scheduled surgery on Jan 31, 2019. Post surgery he had hypocalcemia and the endocrine team was consulted. He states that he had minimal numbness and tingling around his mouth. He was started on calcium, calcitriol, ergocalciferol. Once his calcium were stable, he was discharged. His PTH was 8.02 with a calcium of 7.7 on discharge on 2/9. He denies any family history of thyroid disease, any exposure to radiation. 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 He was born in [facility]. He denies any perioral numbness or muscle spasm. \par \par Pathology: PTC, classical variant, focal tall cell morphology. 13/64 lymph nodes involved\par \par

## 2020-02-24 NOTE — PHYSICAL EXAM
[Alert] : alert [No Acute Distress] : no acute distress [Well Nourished] : well nourished [Well Developed] : well developed [Normal Sclera/Conjunctiva] : normal sclera/conjunctiva [EOMI] : extra ocular movement intact [No Proptosis] : no proptosis [Normal Oropharynx] : the oropharynx was normal [No Respiratory Distress] : no respiratory distress [No Accessory Muscle Use] : no accessory muscle use [Normal Rate] : heart rate was normal  [Clear to Auscultation] : lungs were clear to auscultation bilaterally [Normal S1, S2] : normal S1 and S2 [Regular Rhythm] : with a regular rhythm [Pedal Pulses Normal] : the pedal pulses are present [No Edema] : there was no peripheral edema [Normal Bowel Sounds] : normal bowel sounds [Not Tender] : non-tender [Soft] : abdomen soft [Not Distended] : not distended [Post Cervical Nodes] : posterior cervical nodes [Anterior Cervical Nodes] : anterior cervical nodes [Axillary Nodes] : axillary nodes [Normal] : normal and non tender [No Spinal Tenderness] : no spinal tenderness [Spine Straight] : spine straight [No Stigmata of Cushings Syndrome] : no stigmata of cushings syndrome [Normal Gait] : normal gait [Normal Strength/Tone] : muscle strength and tone were normal [No Rash] : no rash [Normal Reflexes] : deep tendon reflexes were 2+ and symmetric [Oriented x3] : oriented to person, place, and time [No Tremors] : no tremors [Acanthosis Nigricans] : no acanthosis nigricans [de-identified] : post surgical scar

## 2020-02-24 NOTE — ASSESSMENT
[FreeTextEntry1] : 1. Papillary Thryoid Cancer\par Patient with differentiated PTC s/p total thyroidectomy and neck dissection\par Patient is to be sent for [place] UPtake Study for evaluation of Metastatic Disease\par Will check TFTs today\par Cont Levothyroxine 150 mcg QD for now\par Patient will eventually go for KATE treatment\par \par 2. Hypoparathyroidism 2/2 thyroidectomy\par PAtient currently asymptomatic\par Will cont current meds and check levels\par Labs for CMP, PTH, Mg, Phos, Vit D levels\par \par Follow up in 6-8 weeks

## 2020-02-24 NOTE — CONSULT LETTER
[Dear  ___] : Dear  [unfilled], [Courtesy Letter:] : I had the pleasure of seeing your patient, [unfilled], in my office today. [Please see my note below.] : Please see my note below. [Sincerely,] : Sincerely, [FreeTextEntry3] : Kylee Reinoso MS. DO.\par Endocrinology\par [address] [place] [address]\[place] [address]\par Tel [phone]\par Fax [phone]\par

## 2020-02-25 LAB
24R-OH-CALCIDIOL SERPL-MCNC: 83.4 PG/ML
25(OH)D3 SERPL-MCNC: 46.9 NG/ML
ALBUMIN SERPL ELPH-MCNC: 4.8 G/DL
ALP BLD-CCNC: 124 U/L
ALT SERPL-CCNC: 30 U/L
ANION GAP SERPL CALC-SCNC: 16 MMOL/L
AST SERPL-CCNC: 30 U/L
BILIRUB SERPL-MCNC: 0.8 MG/DL
BUN SERPL-MCNC: 18 MG/DL
CALCIUM SERPL-MCNC: 9.9 MG/DL
CALCIUM SERPL-MCNC: 9.9 MG/DL
CHLORIDE SERPL-SCNC: 101 MMOL/L
CO2 SERPL-SCNC: 25 MMOL/L
CREAT SERPL-MCNC: 1.04 MG/DL
GLUCOSE SERPL-MCNC: 94 MG/DL
MAGNESIUM SERPL-MCNC: 2.1 MG/DL
PARATHYROID HORMONE INTACT: 15 PG/ML
PHOSPHATE SERPL-MCNC: 4.1 MG/DL
POTASSIUM SERPL-SCNC: 4.7 MMOL/L
PROT SERPL-MCNC: 7.4 G/DL
SODIUM SERPL-SCNC: 142 MMOL/L
T3 SERPL-MCNC: 133 NG/DL
T4 FREE SERPL-MCNC: 2.1 NG/DL
THYROGLOB AB SERPL-ACNC: <20 IU/ML
THYROPEROXIDASE AB SERPL IA-ACNC: 61 IU/ML
TSH SERPL-ACNC: 0.35 UIU/ML

## 2020-02-26 DIAGNOSIS — Z71.89 OTHER SPECIFIED COUNSELING: ICD-10-CM

## 2020-03-23 ENCOUNTER — APPOINTMENT (OUTPATIENT)
Dept: NUCLEAR MEDICINE | Facility: CLINIC | Age: 55
End: 2020-03-23
Payer: COMMERCIAL

## 2020-03-23 ENCOUNTER — OUTPATIENT (OUTPATIENT)
Dept: OUTPATIENT SERVICES | Facility: HOSPITAL | Age: 55
LOS: 1 days | End: 2020-03-23

## 2020-03-23 DIAGNOSIS — Z98.890 OTHER SPECIFIED POSTPROCEDURAL STATES: Chronic | ICD-10-CM

## 2020-03-23 DIAGNOSIS — C73 MALIGNANT NEOPLASM OF THYROID GLAND: ICD-10-CM

## 2020-03-24 ENCOUNTER — APPOINTMENT (OUTPATIENT)
Dept: NUCLEAR MEDICINE | Facility: CLINIC | Age: 55
End: 2020-03-24

## 2020-03-25 ENCOUNTER — APPOINTMENT (OUTPATIENT)
Dept: NUCLEAR MEDICINE | Facility: CLINIC | Age: 55
End: 2020-03-25

## 2020-03-25 ENCOUNTER — RESULT REVIEW (OUTPATIENT)
Age: 55
End: 2020-03-25

## 2020-03-27 ENCOUNTER — APPOINTMENT (OUTPATIENT)
Dept: NUCLEAR MEDICINE | Facility: CLINIC | Age: 55
End: 2020-03-27

## 2020-03-27 ENCOUNTER — RESULT REVIEW (OUTPATIENT)
Age: 55
End: 2020-03-27

## 2020-03-27 PROCEDURE — 78018 THYROID MET IMAGING BODY: CPT | Mod: 26

## 2020-03-27 PROCEDURE — 78830 RP LOCLZJ TUM SPECT W/CT 1: CPT | Mod: 26

## 2020-05-05 ENCOUNTER — APPOINTMENT (OUTPATIENT)
Dept: OTOLARYNGOLOGY | Facility: CLINIC | Age: 55
End: 2020-05-05
Payer: COMMERCIAL

## 2020-05-05 ENCOUNTER — APPOINTMENT (OUTPATIENT)
Dept: OTOLARYNGOLOGY | Facility: CLINIC | Age: 55
End: 2020-05-05

## 2020-05-05 VITALS
DIASTOLIC BLOOD PRESSURE: 100 MMHG | RESPIRATION RATE: 16 BRPM | HEART RATE: 70 BPM | WEIGHT: 175 LBS | HEIGHT: 64 IN | BODY MASS INDEX: 29.88 KG/M2 | SYSTOLIC BLOOD PRESSURE: 189 MMHG

## 2020-05-05 VITALS — SYSTOLIC BLOOD PRESSURE: 198 MMHG | HEART RATE: 68 BPM | TEMPERATURE: 98.6 F | DIASTOLIC BLOOD PRESSURE: 100 MMHG

## 2020-05-05 PROCEDURE — 99214 OFFICE O/P EST MOD 30 MIN: CPT

## 2020-05-05 NOTE — PHYSICAL EXAM
[de-identified] : Wound well healed.  No scar hypertrophy. [Midline] : trachea located in midline position [Normal] : no rashes

## 2020-05-05 NOTE — CONSULT LETTER
[Dear  ___] : Dear  [unfilled], [Courtesy Letter:] : I had the pleasure of seeing your patient, [unfilled], in my office today. [Please see my note below.] : Please see my note below. [Consult Closing:] : Thank you very much for allowing me to participate in the care of this patient.  If you have any questions, please do not hesitate to contact me. [Sincerely,] : Sincerely, [FreeTextEntry2] : Kiya Joshi, DO [FreeTextEntry3] : Rahul Boykin MD, FACS\par Chief of Otolaryngology - [facility]\par  - [facility]\par & Dept. of Otolaryngology and Head & Neck Surgery\par  [DrManny  ___] : Dr. RAY

## 2020-05-05 NOTE — HISTORY OF PRESENT ILLNESS
[None] : The patient is currently asymptomatic. [de-identified] : Mr. THRASHER is a 54 year male who presents in 4 month follow up s/p total thyroidectomy and bilateral modified neck dissection on January 31, 2020 for metastatic papillary thyroid cancer.  He is also post KATE on March 2020.  He is doing well and is otherwise without any complaints and denies any pain or discomfort.  He is currently on Synthroid 150mcg, CaC03 500mg BID and Calcitriole 0.25mcg BID.  His last Calcium level on 2/24 was 9.9 with iPTH of 15.  He is also under the care of his endocrinologist, Dr. Reinoso. [Neck Mass] : no neck mass [Difficulty Swallowing] : no difficulty swallowing [Painful Swallowing] : no painful swallowing

## 2020-06-03 ENCOUNTER — APPOINTMENT (OUTPATIENT)
Dept: ENDOCRINOLOGY | Facility: CLINIC | Age: 55
End: 2020-06-03
Payer: MEDICAID

## 2020-06-03 VITALS
HEART RATE: 89 BPM | OXYGEN SATURATION: 99 % | WEIGHT: 175 LBS | RESPIRATION RATE: 14 BRPM | HEIGHT: 64 IN | DIASTOLIC BLOOD PRESSURE: 90 MMHG | BODY MASS INDEX: 29.88 KG/M2 | SYSTOLIC BLOOD PRESSURE: 157 MMHG

## 2020-06-03 PROCEDURE — 99214 OFFICE O/P EST MOD 30 MIN: CPT | Mod: 25

## 2020-06-03 PROCEDURE — 36415 COLL VENOUS BLD VENIPUNCTURE: CPT

## 2020-06-03 NOTE — PHYSICAL EXAM
[Alert] : alert [Well Nourished] : well nourished [No Acute Distress] : no acute distress [Well Developed] : well developed [Normal Sclera/Conjunctiva] : normal sclera/conjunctiva [EOMI] : extra ocular movement intact [No Proptosis] : no proptosis [Normal Oropharynx] : the oropharynx was normal [No Respiratory Distress] : no respiratory distress [No Accessory Muscle Use] : no accessory muscle use [Clear to Auscultation] : lungs were clear to auscultation bilaterally [Normal S1, S2] : normal S1 and S2 [Normal Rate] : heart rate was normal [Regular Rhythm] : with a regular rhythm [No Edema] : no peripheral edema [Pedal Pulses Normal] : the pedal pulses are present [Normal Bowel Sounds] : normal bowel sounds [Not Tender] : non-tender [Not Distended] : not distended [Soft] : abdomen soft [Normal Anterior Cervical Nodes] : no anterior cervical lymphadenopathy [Normal Posterior Cervical Nodes] : no posterior cervical lymphadenopathy [No Spinal Tenderness] : no spinal tenderness [Spine Straight] : spine straight [Normal Gait] : normal gait [No Stigmata of Cushings Syndrome] : no stigmata of Cushings Syndrome [No Rash] : no rash [Normal Strength/Tone] : muscle strength and tone were normal [Acanthosis Nigricans] : no acanthosis nigricans [Normal Reflexes] : deep tendon reflexes were 2+ and symmetric [No Tremors] : no tremors [Oriented x3] : oriented to person, place, and time [de-identified] : no thyroid appreciated

## 2020-06-03 NOTE — CONSULT LETTER
[Dear  ___] : Dear  [unfilled], [Please see my note below.] : Please see my note below. [Sincerely,] : Sincerely, [Consult Closing:] : Thank you very much for allowing me to participate in the care of this patient.  If you have any questions, please do not hesitate to contact me. [FreeTextEntry2] : Kiya Joshi [DrManny  ___] : Dr. RAY [FreeTextEntry3] : Kylee Reinoso MS. DO.\par Endocrinology, Diabetes and Metabolism\par [address] [place] [address]\[place] [address]\par Tel [phone]\par Fax [phone]\par

## 2020-06-03 NOTE — ASSESSMENT
[FreeTextEntry1] : 1. 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 Hypoparathyroidism 2/2 thyroidectomy\par Patient currently asymptomatic\par Will cont current meds and check levels\par Labs for CMP, PTH, Mg, Phos, Vit D levels\par \par Follow up in 8 weeks

## 2020-06-03 NOTE — HISTORY OF PRESENT ILLNESS
[FreeTextEntry1] : Mr. INOCENCIA THRASHER is a 54 year old male with a past medical history of PTC s/p total thyroidectomy w/ b/l modified neck dissection cb hypocalcemia. In December 2019, he noticed a lump on his neck. He went to  and he eventually had an US and a CT scan done. He then established with Dr Boykin . He had scheduled surgery on Jan 31, 2019. Post surgery he had hypocalcemia and the endocrine team was consulted. He states that he had minimal numbness and tingling around his mouth. He was started on calcium, calcitriol, ergocalciferol. Once his calcium were stable, he was discharged. His PTH was 8.02 with a calcium of 7.7 on discharge on 2/9. He denies any family history of thyroid disease, any exposure to radiation. 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 He denies any perioral numbness or muscle spasm. \par \par Pathology: PTC, classical variant, focal tall cell morphology. 13/64 lymph nodes involved\par \par Interval HX (6/3/20):\par He had KATE done in March 2020. He has followed up with Dr Boykin and was supposed to have his blood work done but never went in to draw. He is compliant with all the medications. He does complain of some fatigue currently. He denies any constipation, change in appetite, change in weight, numbness or tingling. He has tightness in the skin under his chin and some pain on the chest where the drains were in place.\par

## 2020-06-10 LAB
24R-OH-CALCIDIOL SERPL-MCNC: 48.3 PG/ML
25(OH)D3 SERPL-MCNC: 65.6 NG/ML
ALBUMIN SERPL ELPH-MCNC: 4.9 G/DL
ALP BLD-CCNC: 92 U/L
ALT SERPL-CCNC: 14 U/L
ANION GAP SERPL CALC-SCNC: 14 MMOL/L
AST SERPL-CCNC: 21 U/L
BILIRUB SERPL-MCNC: 0.9 MG/DL
BUN SERPL-MCNC: 21 MG/DL
CALCIUM SERPL-MCNC: 9.7 MG/DL
CALCIUM SERPL-MCNC: 9.7 MG/DL
CHLORIDE SERPL-SCNC: 101 MMOL/L
CO2 SERPL-SCNC: 26 MMOL/L
CREAT SERPL-MCNC: 1.01 MG/DL
GLUCOSE SERPL-MCNC: 91 MG/DL
PARATHYROID HORMONE INTACT: 17 PG/ML
PHOSPHATE SERPL-MCNC: 3.2 MG/DL
POTASSIUM SERPL-SCNC: 4.7 MMOL/L
PROT SERPL-MCNC: 7.7 G/DL
SODIUM SERPL-SCNC: 141 MMOL/L
T3 SERPL-MCNC: 149 NG/DL
T4 FREE SERPL-MCNC: 1.8 NG/DL
THYROGLOB AB SERPL-ACNC: <20 IU/ML
THYROGLOB AB SERPL-ACNC: <20 IU/ML
THYROGLOB SERPL-MCNC: 1.04 NG/ML
THYROPEROXIDASE AB SERPL IA-ACNC: 26.5 IU/ML
TSH SERPL-ACNC: 0.02 UIU/ML

## 2020-07-07 ENCOUNTER — OUTPATIENT (OUTPATIENT)
Dept: OUTPATIENT SERVICES | Facility: HOSPITAL | Age: 55
LOS: 1 days | End: 2020-07-07

## 2020-07-07 ENCOUNTER — APPOINTMENT (OUTPATIENT)
Dept: NUCLEAR MEDICINE | Facility: CLINIC | Age: 55
End: 2020-07-07

## 2020-07-07 DIAGNOSIS — Z98.890 OTHER SPECIFIED POSTPROCEDURAL STATES: Chronic | ICD-10-CM

## 2020-07-07 DIAGNOSIS — C73 MALIGNANT NEOPLASM OF THYROID GLAND: ICD-10-CM

## 2020-07-08 ENCOUNTER — APPOINTMENT (OUTPATIENT)
Dept: NUCLEAR MEDICINE | Facility: CLINIC | Age: 55
End: 2020-07-08

## 2020-07-09 ENCOUNTER — APPOINTMENT (OUTPATIENT)
Dept: NUCLEAR MEDICINE | Facility: CLINIC | Age: 55
End: 2020-07-09

## 2020-07-16 ENCOUNTER — APPOINTMENT (OUTPATIENT)
Dept: NUCLEAR MEDICINE | Facility: CLINIC | Age: 55
End: 2020-07-16

## 2020-07-21 ENCOUNTER — OUTPATIENT (OUTPATIENT)
Dept: OUTPATIENT SERVICES | Facility: HOSPITAL | Age: 55
LOS: 1 days | End: 2020-07-21

## 2020-07-21 ENCOUNTER — APPOINTMENT (OUTPATIENT)
Dept: NUCLEAR MEDICINE | Facility: CLINIC | Age: 55
End: 2020-07-21
Payer: MEDICAID

## 2020-07-21 DIAGNOSIS — C73 MALIGNANT NEOPLASM OF THYROID GLAND: ICD-10-CM

## 2020-07-21 DIAGNOSIS — Z98.890 OTHER SPECIFIED POSTPROCEDURAL STATES: Chronic | ICD-10-CM

## 2020-07-22 ENCOUNTER — APPOINTMENT (OUTPATIENT)
Dept: NUCLEAR MEDICINE | Facility: CLINIC | Age: 55
End: 2020-07-22

## 2020-07-23 ENCOUNTER — APPOINTMENT (OUTPATIENT)
Dept: NUCLEAR MEDICINE | Facility: CLINIC | Age: 55
End: 2020-07-23

## 2020-07-23 ENCOUNTER — RESULT REVIEW (OUTPATIENT)
Age: 55
End: 2020-07-23

## 2020-07-23 PROCEDURE — 79005 NUCLEAR RX ORAL ADMIN: CPT | Mod: 26

## 2020-07-24 ENCOUNTER — TRANSCRIPTION ENCOUNTER (OUTPATIENT)
Age: 55
End: 2020-07-24

## 2020-07-30 ENCOUNTER — RESULT REVIEW (OUTPATIENT)
Age: 55
End: 2020-07-30

## 2020-07-30 ENCOUNTER — OUTPATIENT (OUTPATIENT)
Dept: OUTPATIENT SERVICES | Facility: HOSPITAL | Age: 55
LOS: 1 days | End: 2020-07-30

## 2020-07-30 ENCOUNTER — APPOINTMENT (OUTPATIENT)
Dept: NUCLEAR MEDICINE | Facility: CLINIC | Age: 55
End: 2020-07-30
Payer: MEDICAID

## 2020-07-30 DIAGNOSIS — Z00.8 ENCOUNTER FOR OTHER GENERAL EXAMINATION: ICD-10-CM

## 2020-07-30 DIAGNOSIS — Z98.890 OTHER SPECIFIED POSTPROCEDURAL STATES: Chronic | ICD-10-CM

## 2020-07-30 PROCEDURE — 78018 THYROID MET IMAGING BODY: CPT | Mod: 26

## 2020-08-04 ENCOUNTER — APPOINTMENT (OUTPATIENT)
Dept: ENDOCRINOLOGY | Facility: CLINIC | Age: 55
End: 2020-08-04
Payer: MEDICAID

## 2020-08-04 VITALS
DIASTOLIC BLOOD PRESSURE: 93 MMHG | SYSTOLIC BLOOD PRESSURE: 154 MMHG | OXYGEN SATURATION: 99 % | HEART RATE: 66 BPM | WEIGHT: 176 LBS | HEIGHT: 64 IN | BODY MASS INDEX: 30.05 KG/M2

## 2020-08-04 PROCEDURE — 36415 COLL VENOUS BLD VENIPUNCTURE: CPT

## 2020-08-04 PROCEDURE — 99214 OFFICE O/P EST MOD 30 MIN: CPT | Mod: 25

## 2020-08-04 NOTE — CONSULT LETTER
[Dear  ___] : Dear  [unfilled], [Courtesy Letter:] : I had the pleasure of seeing your patient, [unfilled], in my office today. [Please see my note below.] : Please see my note below. [Consult Closing:] : Thank you very much for allowing me to participate in the care of this patient.  If you have any questions, please do not hesitate to contact me. [Sincerely,] : Sincerely, [FreeTextEntry3] : Kylee Reinoso MS. DO.\par Endocrinology, Diabetes and Metabolism\par [address] [place] [address]\[place] [address]\par Tel [phone]\par Fax [phone]\par  [DrManny  ___] : Dr. RAY

## 2020-08-04 NOTE — HISTORY OF PRESENT ILLNESS
[FreeTextEntry1] : Mr. INOCENCIA THRASHER is a 54 year old male with a past medical history of PTC s/p total thyroidectomy w/ b/l modified neck dissection (1/2020), KATE (7/2020) cb hypocalcemia. \par \par Initial Hx: In December 2019, he noticed a lump on his neck. He went to  and he eventually had an US and a CT scan done. He then established with Dr Boykin . He had scheduled surgery on Jan 31, 2019. Post surgery he had hypocalcemia and the endocrine team was consulted. He states that he had minimal numbness and tingling around his mouth. He was started on calcium, calcitriol, ergocalciferol. Once his calcium were stable, he was discharged. His PTH was 8.02 with a calcium of 7.7 on discharge on 2/9. He denies any family history of thyroid disease, any exposure to radiation. 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 He denies any perioral numbness or muscle spasm. \par \par Pathology: PTC, 6.9 x 3.5 x 1.8 cm, classical variant, focal tall cell morphology. 13/64 lymph nodes involved\par \par Interval HX (6/3/20):\par He had KATE uptake scan done in March 2020. He has followed up with Dr Boykin and was supposed to have his blood work done but never went in to draw. He is compliant with all the medications. He does complain of some fatigue currently. He denies any constipation, change in appetite, change in weight, numbness or tingling. He has tightness in the skin under his chin and some pain on the chest where the drains were in place.\par \par Interval Hx (8/4/20): Patient is here for follow-up. He did not receive my messages or mail and continued the Calcitriol and Calcium high dose. He had KATE treatment dose and post scan done last week (07/23/20- KATE with 150.3 mCi of I -131). Patient has anterior neck pain and decrease in sense of taste afterwards.\par

## 2020-08-04 NOTE — PHYSICAL EXAM
[Alert] : alert [Well Nourished] : well nourished [No Acute Distress] : no acute distress [Well Developed] : well developed [Normal Sclera/Conjunctiva] : normal sclera/conjunctiva [EOMI] : extra ocular movement intact [No Proptosis] : no proptosis [Normal Oropharynx] : the oropharynx was normal [No Respiratory Distress] : no respiratory distress [No Accessory Muscle Use] : no accessory muscle use [Clear to Auscultation] : lungs were clear to auscultation bilaterally [Normal S1, S2] : normal S1 and S2 [Normal Rate] : heart rate was normal [Regular Rhythm] : with a regular rhythm [No Edema] : no peripheral edema [Pedal Pulses Normal] : the pedal pulses are present [Normal Bowel Sounds] : normal bowel sounds [Not Tender] : non-tender [Soft] : abdomen soft [Not Distended] : not distended [No Spinal Tenderness] : no spinal tenderness [Normal Anterior Cervical Nodes] : no anterior cervical lymphadenopathy [Spine Straight] : spine straight [No Stigmata of Cushings Syndrome] : no stigmata of Cushings Syndrome [Normal Gait] : normal gait [Normal Strength/Tone] : muscle strength and tone were normal [No Rash] : no rash [Normal Reflexes] : deep tendon reflexes were 2+ and symmetric [No Tremors] : no tremors [Oriented x3] : oriented to person, place, and time [Acanthosis Nigricans] : no acanthosis nigricans [de-identified] : no thyroid appreciated

## 2020-08-04 NOTE — DATA REVIEWED
[FreeTextEntry1] : EXAM:  NM THYROID POST THERAP IMG WB\par \par \par PROCEDURE DATE:  07/30/2020\par \par \par \par INTERPRETATION:  RADIOPHARMACEUTICAL:  150.3 mCi I-131 sodium iodide capsule, orally\par \par CLINICAL INFORMATION: 54 year old male status post total thyroidectomy in January 2020 followed by thyroid remnant ablation with 150.3 mCi I-131 on 7/23/2020; referred for post therapy imaging.\par \par PATHOLOGY: Papillary thyroid carcinoma, multifocal, classic variant and focal tall cell morphology, 6.9 cm in greatest dimension, no angioinvasion, no lymphatic invasion, no perineural invasion, no extrathyroidal extension, 13/64 lymph nodes positive for tumor, pT3b, N1b, Mx, stage II.\par \par TECHNIQUE:  Anterior and posterior whole body images and static images of the head/neck, chest, and abdomen/pelvis were obtained in the anterior and posterior projections seven days following radioiodine administration. Pinhole images of the anterior neck, with and without markers, also were acquired.\par \par COMPARISON: Whole-body iodine scan performed on 3/27/2020 was reviewed.\par \par FINDINGS:  There is increased radioiodine uptake in the anterior neck/thyroid bed.  There is physiological distribution of radioiodine in the remainder of the visualized structures.\par \par IMPRESSION:  Post therapy whole body I-131 scan demonstrates:\par \par Iodine avid tissue in the anterior neck/thyroid bed.\par \par No evidence of distant functioning metastasis.\par \par No significant change from the pretreatment diagnostic study performed on 3/27/2020.\par \par \par

## 2020-08-04 NOTE — ASSESSMENT
[FreeTextEntry1] : 54 year old male with a past medical history of PTC s/p total thyroidectomy w/ b/l modified neck dissection cb hypocalcemia\par \par 1. 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 Hypoparathyroidism 2/2 thyroidectomy\par Stop Calcitriol and dec Calcium to 1 tab QD\par Labs for CMP, PTH, Vit D levels\par \par Follow up in 8 weeks

## 2020-08-05 LAB
25(OH)D3 SERPL-MCNC: 55.7 NG/ML
ALBUMIN SERPL ELPH-MCNC: 4.7 G/DL
ALP BLD-CCNC: 92 U/L
ALT SERPL-CCNC: 6 U/L
ANION GAP SERPL CALC-SCNC: 13 MMOL/L
AST SERPL-CCNC: 19 U/L
BILIRUB SERPL-MCNC: 1.2 MG/DL
BUN SERPL-MCNC: 15 MG/DL
CALCIUM SERPL-MCNC: 9 MG/DL
CALCIUM SERPL-MCNC: 9 MG/DL
CHLORIDE SERPL-SCNC: 105 MMOL/L
CO2 SERPL-SCNC: 24 MMOL/L
CREAT SERPL-MCNC: 1.06 MG/DL
GLUCOSE SERPL-MCNC: 98 MG/DL
PARATHYROID HORMONE INTACT: 18 PG/ML
POTASSIUM SERPL-SCNC: 4.2 MMOL/L
PROT SERPL-MCNC: 7 G/DL
SODIUM SERPL-SCNC: 143 MMOL/L
T3 SERPL-MCNC: 157 NG/DL
T4 FREE SERPL-MCNC: 2.2 NG/DL
THYROGLOB AB SERPL-ACNC: <20 IU/ML
THYROGLOB SERPL-MCNC: 1.05 NG/ML
TSH SERPL-ACNC: 0.11 UIU/ML

## 2020-10-05 ENCOUNTER — APPOINTMENT (OUTPATIENT)
Dept: ENDOCRINOLOGY | Facility: CLINIC | Age: 55
End: 2020-10-05
Payer: MEDICAID

## 2020-10-05 VITALS
HEIGHT: 64 IN | WEIGHT: 177 LBS | DIASTOLIC BLOOD PRESSURE: 108 MMHG | OXYGEN SATURATION: 97 % | BODY MASS INDEX: 30.22 KG/M2 | HEART RATE: 77 BPM | SYSTOLIC BLOOD PRESSURE: 182 MMHG

## 2020-10-05 PROCEDURE — 99214 OFFICE O/P EST MOD 30 MIN: CPT | Mod: 25

## 2020-10-05 PROCEDURE — 36415 COLL VENOUS BLD VENIPUNCTURE: CPT

## 2020-10-05 NOTE — PHYSICAL EXAM
[Alert] : alert [Well Nourished] : well nourished [No Acute Distress] : no acute distress [Well Developed] : well developed [Normal Sclera/Conjunctiva] : normal sclera/conjunctiva [EOMI] : extra ocular movement intact [No Proptosis] : no proptosis [Normal Oropharynx] : the oropharynx was normal [No Respiratory Distress] : no respiratory distress [No Accessory Muscle Use] : no accessory muscle use [Clear to Auscultation] : lungs were clear to auscultation bilaterally [Normal S1, S2] : normal S1 and S2 [Normal Rate] : heart rate was normal [Regular Rhythm] : with a regular rhythm [No Edema] : no peripheral edema [Pedal Pulses Normal] : the pedal pulses are present [Normal Bowel Sounds] : normal bowel sounds [Not Tender] : non-tender [Not Distended] : not distended [Soft] : abdomen soft [Normal Anterior Cervical Nodes] : no anterior cervical lymphadenopathy [No Spinal Tenderness] : no spinal tenderness [Spine Straight] : spine straight [No Stigmata of Cushings Syndrome] : no stigmata of Cushings Syndrome [Normal Gait] : normal gait [Normal Strength/Tone] : muscle strength and tone were normal [No Rash] : no rash [Acanthosis Nigricans] : no acanthosis nigricans [Normal Reflexes] : deep tendon reflexes were 2+ and symmetric [No Tremors] : no tremors [Oriented x3] : oriented to person, place, and time [de-identified] : no thyroid appreciated

## 2020-10-05 NOTE — HISTORY OF PRESENT ILLNESS
[FreeTextEntry1] : Mr. INOCENCIA THRASHER is a 55 year old male with a past medical history of PTC s/p total thyroidectomy w/ b/l modified neck dissection (1/2020), KATE (7/2020) cb hypocalcemia. \par \par Initial Hx: In December 2019, he noticed a lump on his neck. He went to  and he eventually had an US and a CT scan done. He then established with Dr Boykin . He had scheduled surgery on Jan 31, 2019. Post surgery he had hypocalcemia and the endocrine team was consulted. He states that he had minimal numbness and tingling around his mouth. He was started on calcium, calcitriol, ergocalciferol. Once his calcium were stable, he was discharged. His PTH was 8.02 with a calcium of 7.7 on discharge on 2/9. He denies any family history of thyroid disease, any exposure to radiation. 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 He denies any perioral numbness or muscle spasm. \par \par Pathology: PTC, 6.9 x 3.5 x 1.8 cm, classical variant, focal tall cell morphology. 13/64 lymph nodes involved\par \par Interval HX (6/3/20):\par He had KATE uptake scan done in March 2020. He has followed up with Dr Boykin and was supposed to have his blood work done but never went in to draw. He is compliant with all the medications. He does complain of some fatigue currently. He denies any constipation, change in appetite, change in weight, numbness or tingling. He has tightness in the skin under his chin and some pain on the chest where the drains were in place.\par \par Interval Hx (8/4/20): Patient is here for follow-up. He did not receive my messages or mail and continued the Calcitriol and Calcium high dose. He had KATE treatment dose and post scan done last week (07/23/20- KATE with 150.3 mCi of I -131). Patient has anterior neck pain and decrease in sense of taste afterwards.\par \par Interval Hx (10/5/20): Patient is here follow-up. He stopped the Calcitriol and is taking calcium once daily. He has return of taste. He denies any tingling, change in appetite or weight changes. Patient is supposed to be on HTN medication but has not been taking it. His BP is elevated but her denies any HA, nausea or vomiting. \par

## 2020-10-05 NOTE — ASSESSMENT
[FreeTextEntry1] :  55 year old male with a past medical history of PTC s/p total thyroidectomy w/ b/l modified neck dissection (1/2020), KATE (7/2020) cb hypocalcemia\par \par 1. 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 Hypoparathyroidism 2/2 thyroidectomy\par Patient currently asymptomatic\par Will cont current meds and check levels\par Labs for CMP, PTH, Mg, Phos, Vit D levels\par \par Follow up in 8 weeks

## 2020-10-06 LAB
24R-OH-CALCIDIOL SERPL-MCNC: 49.9 PG/ML
25(OH)D3 SERPL-MCNC: 45.6 NG/ML
ALBUMIN SERPL ELPH-MCNC: 4.7 G/DL
ALP BLD-CCNC: 109 U/L
ALT SERPL-CCNC: 14 U/L
ANION GAP SERPL CALC-SCNC: 14 MMOL/L
AST SERPL-CCNC: 26 U/L
BILIRUB SERPL-MCNC: 0.7 MG/DL
BUN SERPL-MCNC: 16 MG/DL
CALCIUM SERPL-MCNC: 8.6 MG/DL
CALCIUM SERPL-MCNC: 8.6 MG/DL
CHLORIDE SERPL-SCNC: 105 MMOL/L
CO2 SERPL-SCNC: 22 MMOL/L
CREAT SERPL-MCNC: 0.94 MG/DL
GLUCOSE SERPL-MCNC: 102 MG/DL
PARATHYROID HORMONE INTACT: 32 PG/ML
POTASSIUM SERPL-SCNC: 5 MMOL/L
PROT SERPL-MCNC: 7.1 G/DL
SODIUM SERPL-SCNC: 142 MMOL/L
T3 SERPL-MCNC: 123 NG/DL
T4 FREE SERPL-MCNC: 1.7 NG/DL
TSH SERPL-ACNC: 0.03 UIU/ML

## 2020-10-08 LAB
THYROGLOB AB SERPL-ACNC: <20 IU/ML
THYROGLOB SERPL-MCNC: 0.79 NG/ML

## 2020-12-07 ENCOUNTER — APPOINTMENT (OUTPATIENT)
Dept: ENDOCRINOLOGY | Facility: CLINIC | Age: 55
End: 2020-12-07

## 2020-12-24 ENCOUNTER — APPOINTMENT (OUTPATIENT)
Dept: ENDOCRINOLOGY | Facility: CLINIC | Age: 55
End: 2020-12-24
Payer: MEDICAID

## 2020-12-24 VITALS
WEIGHT: 177 LBS | RESPIRATION RATE: 14 BRPM | BODY MASS INDEX: 30.22 KG/M2 | OXYGEN SATURATION: 99 % | DIASTOLIC BLOOD PRESSURE: 102 MMHG | HEIGHT: 64 IN | HEART RATE: 77 BPM | SYSTOLIC BLOOD PRESSURE: 157 MMHG

## 2020-12-24 PROCEDURE — 36415 COLL VENOUS BLD VENIPUNCTURE: CPT

## 2020-12-24 PROCEDURE — 99214 OFFICE O/P EST MOD 30 MIN: CPT | Mod: 25

## 2020-12-24 PROCEDURE — 99072 ADDL SUPL MATRL&STAF TM PHE: CPT

## 2020-12-26 NOTE — HISTORY OF PRESENT ILLNESS
[FreeTextEntry1] : Mr. INOCENCIA THRASHER is a 55 year old male with a past medical history of PTC s/p total thyroidectomy w/ b/l modified neck dissection (1/2020), KATE (7/2020) cb hypocalcemia. \par \par Initial Hx: In December 2019, he noticed a lump on his neck. He went to  and he eventually had an US and a CT scan done. He then established with Dr Boykin . He had scheduled surgery on Jan 31, 2019. Post surgery he had hypocalcemia and the endocrine team was consulted. He states that he had minimal numbness and tingling around his mouth. He was started on calcium, calcitriol, ergocalciferol. Once his calcium were stable, he was discharged. His PTH was 8.02 with a calcium of 7.7 on discharge on 2/9. He denies any family history of thyroid disease, any exposure to radiation. 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 He denies any perioral numbness or muscle spasm. \par \par Pathology: PTC, 6.9 x 3.5 x 1.8 cm, classical variant, focal tall cell morphology. 13/64 lymph nodes involved\par \par Interval HX (6/3/20):\par He had KATE uptake scan done in March 2020. He has followed up with Dr Boykin and was supposed to have his blood work done but never went in to draw. He is compliant with all the medications. He does complain of some fatigue currently. He denies any constipation, change in appetite, change in weight, numbness or tingling. He has tightness in the skin under his chin and some pain on the chest where the drains were in place.\par \par Interval Hx (8/4/20): Patient is here for follow-up. He did not receive my messages or mail and continued the Calcitriol and Calcium high dose. He had KATE treatment dose and post scan done last week (07/23/20- KATE with 150.3 mCi of I -131). Patient has anterior neck pain and decrease in sense of taste afterwards.\par \par Interval Hx (10/5/20): Patient is here follow-up. He stopped the Calcitriol and is taking calcium once daily. He has return of taste. He denies any tingling, change in appetite or weight changes. Patient is supposed to be on HTN medication but has not been taking it. His BP is elevated but her denies any HA, nausea or vomiting. \par \par Interval Hx (12/24/20): Patient is here follow-up. His BP still high. He is again admitted to not taking his medication regularly. He stopped Calcium\par

## 2020-12-26 NOTE — ASSESSMENT
[FreeTextEntry1] :  55 year old male with a past medical history of PTC s/p total thyroidectomy w/ b/l modified neck dissection (1/2020), KATE (7/2020) cb hypocalcemia\par \par 1. 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 Hypoparathyroidism 2/2 thyroidectomy\par Patient currently asymptomatic\par Likely resolved\par Will check levels\par \par Follow up in 8 weeks

## 2020-12-26 NOTE — CONSULT LETTER
[Dear  ___] : Dear  [unfilled], [Consult Letter:] : I had the pleasure of evaluating your patient, [unfilled]. [Please see my note below.] : Please see my note below. [Consult Closing:] : Thank you very much for allowing me to participate in the care of this patient.  If you have any questions, please do not hesitate to contact me. [Sincerely,] : Sincerely, [FreeTextEntry3] : Kylee Reinoso MS. DO.\par Endocrinology, Diabetes and Metabolism\par [address] [place] [address]\[place] [address]\par Tel [phone]\par Fax [phone]\par  [DrManny  ___] : Dr. RAY

## 2020-12-29 RX ORDER — B-COMPLEX WITH VITAMIN C
1250 (500 CA) TABLET ORAL DAILY
Qty: 90 | Refills: 3 | Status: DISCONTINUED | COMMUNITY
Start: 2020-02-24 | End: 2020-12-29

## 2020-12-29 RX ORDER — CALCITRIOL 0.5 UG/1
0.5 CAPSULE, LIQUID FILLED ORAL DAILY
Qty: 30 | Refills: 3 | Status: DISCONTINUED | COMMUNITY
End: 2020-12-29

## 2020-12-30 LAB
25(OH)D3 SERPL-MCNC: 34.4 NG/ML
ALBUMIN SERPL ELPH-MCNC: 4.5 G/DL
ALP BLD-CCNC: 97 U/L
ALT SERPL-CCNC: 9 U/L
ANION GAP SERPL CALC-SCNC: 10 MMOL/L
AST SERPL-CCNC: 18 U/L
BILIRUB SERPL-MCNC: 1.1 MG/DL
BUN SERPL-MCNC: 16 MG/DL
CALCIUM SERPL-MCNC: 8.4 MG/DL
CALCIUM SERPL-MCNC: 8.4 MG/DL
CHLORIDE SERPL-SCNC: 105 MMOL/L
CO2 SERPL-SCNC: 25 MMOL/L
CREAT SERPL-MCNC: 1.02 MG/DL
GLUCOSE SERPL-MCNC: 102 MG/DL
PARATHYROID HORMONE INTACT: 47 PG/ML
POTASSIUM SERPL-SCNC: 4.4 MMOL/L
PROT SERPL-MCNC: 6.7 G/DL
SODIUM SERPL-SCNC: 140 MMOL/L
T3 SERPL-MCNC: 157 NG/DL
T4 FREE SERPL-MCNC: 2.2 NG/DL
THYROGLOB AB SERPL-ACNC: <20 IU/ML
THYROGLOB SERPL-MCNC: 0.77 NG/ML
TSH SERPL-ACNC: 0.01 UIU/ML
TSI ACT/NOR SER: <0.1 IU/L

## 2020-12-30 RX ORDER — MULTIVIT-MIN/FOLIC/VIT K/LYCOP 400-300MCG
50 MCG TABLET ORAL
Qty: 90 | Refills: 2 | Status: ACTIVE | COMMUNITY
Start: 2020-02-24 | End: 1900-01-01

## 2021-02-12 ENCOUNTER — OUTPATIENT (OUTPATIENT)
Dept: OUTPATIENT SERVICES | Facility: HOSPITAL | Age: 56
LOS: 1 days | End: 2021-02-12

## 2021-02-12 ENCOUNTER — APPOINTMENT (OUTPATIENT)
Dept: ULTRASOUND IMAGING | Facility: CLINIC | Age: 56
End: 2021-02-12
Payer: MEDICAID

## 2021-02-12 DIAGNOSIS — C73 MALIGNANT NEOPLASM OF THYROID GLAND: ICD-10-CM

## 2021-02-12 DIAGNOSIS — Z98.890 OTHER SPECIFIED POSTPROCEDURAL STATES: Chronic | ICD-10-CM

## 2021-02-12 PROCEDURE — 76536 US EXAM OF HEAD AND NECK: CPT | Mod: 26

## 2021-02-17 ENCOUNTER — APPOINTMENT (OUTPATIENT)
Dept: ENDOCRINOLOGY | Facility: CLINIC | Age: 56
End: 2021-02-17
Payer: MEDICAID

## 2021-02-17 VITALS
BODY MASS INDEX: 30.22 KG/M2 | WEIGHT: 177 LBS | RESPIRATION RATE: 14 BRPM | HEART RATE: 78 BPM | OXYGEN SATURATION: 97 % | HEIGHT: 64 IN | DIASTOLIC BLOOD PRESSURE: 86 MMHG | SYSTOLIC BLOOD PRESSURE: 146 MMHG

## 2021-02-17 DIAGNOSIS — E55.9 VITAMIN D DEFICIENCY, UNSPECIFIED: ICD-10-CM

## 2021-02-17 DIAGNOSIS — Z86.39 PERSONAL HISTORY OF OTHER ENDOCRINE, NUTRITIONAL AND METABOLIC DISEASE: ICD-10-CM

## 2021-02-17 PROCEDURE — 99214 OFFICE O/P EST MOD 30 MIN: CPT

## 2021-02-17 PROCEDURE — 99072 ADDL SUPL MATRL&STAF TM PHE: CPT

## 2021-02-17 RX ORDER — LISINOPRIL AND HYDROCHLOROTHIAZIDE TABLETS 20; 25 MG/1; MG/1
20-25 TABLET ORAL
Refills: 0 | Status: ACTIVE | COMMUNITY

## 2021-02-17 NOTE — ASSESSMENT
[FreeTextEntry1] :  55 year old male with a past medical history of PTC s/p total thyroidectomy w/ b/l modified neck dissection (1/2020), KATE (7/2020) cb hypocalcemia\par \par 1. 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 US shows 0.5 x 0.5 cm hyperechoic area in the right thyroidectomy bed?\par Will send for WBS to be done before next follow up\par Cont Levothyroxine 150 mcg QD, Keeping TSH suppressed\par \par 2. Vitamin D deficiency\par Cont D3 2000 QD\par \par Follow up in May

## 2021-02-17 NOTE — HISTORY OF PRESENT ILLNESS
[FreeTextEntry1] : Mr. INOCENCIA THRASHER is a 55 year old male with a past medical history of PTC s/p total thyroidectomy w/ b/l modified neck dissection (1/2020), KATE (7/2020) [id number] here for follow-up. Patient feels well and denies any complaints.\par \par Initial Hx: In December 2019, he noticed a lump on his neck. He went to  and he eventually had an US and a CT scan done. He then established with Dr Boykin . He had scheduled surgery on Jan 31, 2019. Post surgery he had hypocalcemia and the endocrine team was consulted. He states that he had minimal numbness and tingling around his mouth. He was started on calcium, calcitriol, ergocalciferol. Once his calcium were stable, he was discharged. His PTH was 8.02 with a calcium of 7.7 on discharge on 2/9. He denies any family history of thyroid disease, any exposure to radiation. 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 He denies any perioral numbness or muscle spasm. \par \par Pathology: PTC, 6.9 x 3.5 x 1.8 cm, classical variant, focal tall cell morphology. 13/64 lymph nodes involved\par \par Interval HX (6/3/20):\par He had KATE uptake scan done in March 2020. He has followed up with Dr Boykin and was supposed to have his blood work done but never went in to draw. He is compliant with all the medications. He does complain of some fatigue currently. He denies any constipation, change in appetite, change in weight, numbness or tingling. He has tightness in the skin under his chin and some pain on the chest where the drains were in place.\par \par Interval Hx (8/4/20): Patient is here for follow-up. He did not receive my messages or mail and continued the Calcitriol and Calcium high dose. He had KATE treatment dose and post scan done last week (07/23/20- KATE with 150.3 mCi of I -131). Patient has anterior neck pain and decrease in sense of taste afterwards.\par \par Interval Hx (10/5/20): Patient is here follow-up. He stopped the Calcitriol and is taking calcium once daily. He has return of taste. He denies any tingling, change in appetite or weight changes. Patient is supposed to be on HTN medication but has not been taking it. His BP is elevated but her denies any HA, nausea or vomiting. \par \par Interval Hx (12/24/20): Patient is here follow-up. His BP still high. He is again admitted to not taking his medication regularly. He stopped Calcium\par

## 2021-05-05 NOTE — PROGRESS NOTE ADULT - PROBLEM/PLAN-4
Delivery Note    Yelitza Jaramillo is a 20 year old now  female post-delivery at 37w2d.  Pregnancy was significant for preeclampsia without severe features and chorioamnionitis, treated with ampicillin/gentamicin. Uncomplicated  except Apgars of 5,7,9. PH arterial 7.22 with base deficit 6. Baby currently in room with mother.     Mother's Information    Labor Length    1st stage: 9h 27m  2nd stage: 1h 10m  3rd stage: 0h 03m     Lacerations    Episiotomy: None  Perineal laceration: None  Vaginal laceration?: Yes  Vaginal laceration location: left  Vaginal laceration repaired?: Yes  Vaginal delivery est. blood loss (mL): 350  Repair suture: 3-0 Vicryl  Number of repair packets: 1     Delivery Blood Loss  21 0015 - 21 1134    Vaginal delivery EBL (mL) Hospital Encounter 350    Total  350         Denise Jaramillo [[id number]]     Delivery    Birth date/time: 2023 10:52:00  Delivery type: Vaginal, Spontaneous     Labor Events     labor?: No   steroids?: None  Antibiotics during labor?: Yes  Sac identifier: Sac 1  Rupture date/time: 2021 0140  Rupture type: Artificial  Fluid color: Clear  Fluid odor: Normal  Induction: Misoprostol  Induction date/time: 5/3/2021 1323  Induction indications: Hypertension  Augmentation: Oxytocin  Augmentation date/time: 2021 2007  Augmentation indications: Ineffective Contraction Pattern  Labor/Delivery complications: Chorioamnionitis     Anesthesia    Method: IV analgesic, Epidural  Analgesics: NALBUPHINE HCL 10 MG/ML IJ SOLN     Operative Delivery    Forceps attempted?: No  Vacuum extractor attempted?: No     Shoulder Dystocia    Shoulder dystocia present?: No     Procedures    Procedures: None     Presentation    Presentation: Vertex  Position: Left Occiput Anterior     Placenta    Placenta delivery date/time: 2021 1055  Placenta removal: Spontaneous  Placenta appearance: Intact  Placenta disposition: discarded     Cord     Vessels: 3 Vessels  Complications: Nuchal  Nuchal intervention: reduced  Nuchal cord description: loose nuchal cord  Number of loops: 1  Delayed cord clamping?: Yes  Cord clamped date/time: 5/5/2021 1053  Cord blood disposition: Lab  Gases sent?: Yes  Cord comments: pH arterial 7.22 with base deficit of 6  Stem cell collection (by provider): No     Apgars    Living status: Living  Apgars:  1 min.:  5 min.:  10 min.:  15 min.:  20 min.:    Skin color:  0  1  1      Heart rate:  2  2  2      Reflex irritability:  1  2  2      Muscle tone:  1  1  2      Respiratory effort:  1  1  2      Total:  5  7  9      Apgars assigned by: NIGEL HODGE RN     Resuscitation    Method: Suctioning, Oxygen, PPV     Measurements    Weight: 2977 g  Weight (lbs): 6 lb 9 oz  Length: 45.7 cm  Length (in): 18\"  Head circumference: 33 cm     Delivery Providers    Delivering clinician: Leonora Cornejo MD   Provider Role    Kaylee Magallanes RN Delivery Nurse    Nigel Hodge, RN Baby Nurse    Karol CHAUDHARY Cuevas, ST Tech             Review the Delivery Report for details     Leonora Cornejo MD       DISPLAY PLAN FREE TEXT

## 2021-05-19 ENCOUNTER — APPOINTMENT (OUTPATIENT)
Dept: ENDOCRINOLOGY | Facility: CLINIC | Age: 56
End: 2021-05-19

## 2021-06-10 ENCOUNTER — APPOINTMENT (OUTPATIENT)
Dept: OTOLARYNGOLOGY | Facility: CLINIC | Age: 56
End: 2021-06-10
Payer: MEDICAID

## 2021-06-10 VITALS
TEMPERATURE: 97 F | DIASTOLIC BLOOD PRESSURE: 80 MMHG | HEIGHT: 64 IN | WEIGHT: 177 LBS | BODY MASS INDEX: 30.22 KG/M2 | SYSTOLIC BLOOD PRESSURE: 140 MMHG | OXYGEN SATURATION: 97 % | HEART RATE: 60 BPM

## 2021-06-10 DIAGNOSIS — C77.0 SECONDARY AND UNSPECIFIED MALIGNANT NEOPLASM OF LYMPH NODES OF HEAD, FACE AND NECK: ICD-10-CM

## 2021-06-10 PROCEDURE — 99072 ADDL SUPL MATRL&STAF TM PHE: CPT

## 2021-06-10 PROCEDURE — 99214 OFFICE O/P EST MOD 30 MIN: CPT

## 2021-06-10 NOTE — CONSULT LETTER
[Dear  ___] : Dear  [unfilled], [Courtesy Letter:] : I had the pleasure of seeing your patient, [unfilled], in my office today. [Please see my note below.] : Please see my note below. [Consult Closing:] : Thank you very much for allowing me to participate in the care of this patient.  If you have any questions, please do not hesitate to contact me. [Sincerely,] : Sincerely, [FreeTextEntry3] : Rahul Boykin MD, FACS\par Chief of Otolaryngology [facility]\par  - Dept. of Otolaryngology\par [employer] [employer]\par \par  [FreeTextEntry2] : Kiya Joshi, DO  [DrManny  ___] : Dr. RAY

## 2021-06-10 NOTE — PHYSICAL EXAM
[de-identified] : Wound well healed.  No scar hypertrophy.  No palpable masses. [Midline] : trachea located in midline position [Normal] : no rashes

## 2021-06-10 NOTE — ASSESSMENT
[FreeTextEntry1] : Pt remains ANEL clinically.  Will obtain a f/u sono to reassess the right thyroid bed lesion.

## 2021-06-10 NOTE — REASON FOR VISIT
[Subsequent Evaluation] : a subsequent evaluation for [FreeTextEntry2] : f/u re: papillary thyroid cancer

## 2021-06-10 NOTE — DATA REVIEWED
Chief Complaint: \"I am excited. \" 
  
Interval History: 
Patient reports she feels well and she is excited about her anticipated discharge. Denies SI/HI. Denies psychotic symptoms. Participating in groups 
  
Mental Status Exam: 
General appearance: Well groomed, Psychomotor activity: WNL Eye contact: Good Speech: WNL. Mood: \"Good\" Affect: Euthymic Thought Process: Logical, goal directed Perception: Denies AH or VH. Thought Content: Denies SI/HI; not delusional  
Insight: Good Judgement: Good Cognition: Intact. 
  
Assessment and Plan: Psychotic disorder Continue the current medication regimen Disposition planning to continue. I certify that this patients inpatient psychiatric hospital services furnished since the previous certification were, and continue to be, required for treatment that could reasonably be expected to improve the patient's condition, or for diagnostic study, and that the patient continues to need, on a daily basis, active treatment furnished directly by or requiring the supervision of inpatient psychiatric facility personnel. In addition, the hospital records show that services furnished were intensive treatment services, admission or related services, or equivalent services. Current Facility-Administered Medications Medication Dose Route Frequency  amLODIPine (NORVASC) tablet 5 mg  5 mg Oral DAILY  donepezil (ARICEPT) tablet 5 mg  5 mg Oral QHS  memantine (NAMENDA) tablet 5 mg  5 mg Oral BID  sertraline (ZOLOFT) tablet 100 mg  100 mg Oral DAILY Physical Exam: 
 
 
  
Past Medical History: 
Past Medical History:  
Diagnosis Date  Anxiety disorder  Hypertension  Psychiatric disorder   
 schizophrenia Labs: 
Lab Results Component Value Date/Time  WBC 4.5 04/29/2019 06:34 PM  
 Hemoglobin (POC) 11.9 09/02/2013 10:09 AM  
 HGB 12.7 04/29/2019 06:34 PM  
 Hematocrit (POC) 35 09/02/2013 10:09 AM  
 HCT 38.8 04/29/2019 06:34 PM  
 PLATELET 080 40/48/7258 06:34 PM  
 MCV 90.9 04/29/2019 06:34 PM  
  
Lab Results Component Value Date/Time Sodium 143 04/29/2019 06:34 PM  
 Potassium 4.2 04/29/2019 06:34 PM  
 Chloride 107 04/29/2019 06:34 PM  
 CO2 30 04/29/2019 06:34 PM  
 Anion gap 6 04/29/2019 06:34 PM  
 Glucose 143 (H) 04/29/2019 06:34 PM  
 Glucose 105 (H) 01/23/2019 06:21 AM  
 BUN 15 04/29/2019 06:34 PM  
 Creatinine 0.93 04/29/2019 06:34 PM  
 BUN/Creatinine ratio 16 04/29/2019 06:34 PM  
 GFR est AA >60 04/29/2019 06:34 PM  
 GFR est non-AA >60 04/29/2019 06:34 PM  
 Calcium 9.0 04/29/2019 06:34 PM  
 Bilirubin, total 0.2 04/29/2019 06:34 PM  
 AST (SGOT) 10 (L) 04/29/2019 06:34 PM  
 Alk. phosphatase 60 04/29/2019 06:34 PM  
 Protein, total 7.3 04/29/2019 06:34 PM  
 Albumin 3.8 04/29/2019 06:34 PM  
 Globulin 3.5 04/29/2019 06:34 PM  
 A-G Ratio 1.1 04/29/2019 06:34 PM  
 ALT (SGPT) 13 04/29/2019 06:34 PM  
  
Vitals:  
 05/18/19 0830 05/18/19 1515 05/18/19 1903 05/19/19 0849 BP: 148/86 121/72 127/71 129/89 Pulse: 78 68 70 70 Resp: 16 19 16 18 Temp: 98.3 °F (36.8 °C) 99.1 °F (37.3 °C) 98.6 °F (37 °C) 97.9 °F (36.6 °C) SpO2: 97% 99%  99% Weight:    70 kg (154 lb 6.4 oz) Height:      
   
 
 
 
 
Chief Complaint: \"I am excited. \" 
  
Interval History: 
Patient reports she feels well and she is excited about her anticipation discharge Denies SI/HI. Denies psychotic symptoms. Participating in groups 
  
  
Mental Status Exam: 
General appearance: Well groomed, Psychomotor activity: WNL Eye contact: Good Speech: WNL. Mood: \"Good\" Affect: Euthymic Thought Process: Logical, goal directed Perception: Denies AH or VH. Thought Content: Denies SI/HI; not delusional  
Insight: Good Judgement: Good Cognition: Intact. 
  
Assessment and Plan: Psychotic disorder [de-identified] : \par  US Thyroid + Parathyroid             Final\par \par No Documents Attached\par \par \par \par \par   EXAM:  US THYROID AND PARATHYROID\par \par \par PROCEDURE DATE:  02/12/2021\par \par \par \par INTERPRETATION:  CLINICAL INFORMATION: Thyroid cancer status post thyroidectomy.\par \par COMPARISON: Thyroid ultrasound dated 12/09/2019\par \par TECHNIQUE: Sonography of the neck.\par \par FINDINGS:\par \par The left thyroidectomy bed is unremarkable.\par \par There is 0.5 x 0.5 cm hyperechoic area in the right thyroidectomy bed, likely representing postsurgical changes.\par \par There is no cervical adenopathy.\par \par IMPRESSION:\par \par Probable postsurgical changes in the right thyroidectomy, otherwise unremarkable\par ultrasound. Consider follow-up ultrasound in 6 months.\par \par \par \par \par \par \par HOLLAND HUTSON M.D., ATTENDING RADIOLOGIST Phone #: [phone]\par This document has been electronically signed. Feb 17 2021  8:21AM\par \par  \par \par  Ordered by: DAVID VARELA       Collected/Examined: [id number] 10:03AM       \par Verified by: DAVID VARELA [id number] 09:41AM       \par  Result Communication: No patient communication needed at this time;\par Stage: Final       \par  Performed at: [facility] [place]       Resulted: [id number] 08:24AM       Last Updated: [id number] 09:41AM       Accession: [id number]        [de-identified] : Labs (12/24/2020)\par Ca++ = 8.4\par iPTH = 47\par TSH = 0.01\par Thyroglobulin = 0.77 (Down from 1.04 on 6/3/2020)

## 2021-06-10 NOTE — HISTORY OF PRESENT ILLNESS
[de-identified] : 55 year male who presents for follow up s/p total thyroidectomy and bilateral modified neck dissection on January 31, 2020 for metastatic papillary thyroid cancer. He is also post KATE on March 2020. He is doing well and is otherwise without any complaints and denies any pain or discomfort. He is currently on Synthroid 150 mcg daily.  He is under the care of his endocrinologist, Dr. Reinoso. His most recent sonogram revealed a 5 mm R thyroid bed lesion.

## 2021-06-15 ENCOUNTER — OUTPATIENT (OUTPATIENT)
Dept: OUTPATIENT SERVICES | Facility: HOSPITAL | Age: 56
LOS: 1 days | End: 2021-06-15

## 2021-06-15 ENCOUNTER — APPOINTMENT (OUTPATIENT)
Dept: ULTRASOUND IMAGING | Facility: CLINIC | Age: 56
End: 2021-06-15
Payer: MEDICAID

## 2021-06-15 DIAGNOSIS — C73 MALIGNANT NEOPLASM OF THYROID GLAND: ICD-10-CM

## 2021-06-15 DIAGNOSIS — Z98.890 OTHER SPECIFIED POSTPROCEDURAL STATES: Chronic | ICD-10-CM

## 2021-06-15 PROCEDURE — 76536 US EXAM OF HEAD AND NECK: CPT | Mod: 26

## 2021-06-22 ENCOUNTER — NON-APPOINTMENT (OUTPATIENT)
Age: 56
End: 2021-06-22

## 2021-07-14 ENCOUNTER — RX RENEWAL (OUTPATIENT)
Age: 56
End: 2021-07-14

## 2021-07-22 ENCOUNTER — APPOINTMENT (OUTPATIENT)
Dept: ENDOCRINOLOGY | Facility: CLINIC | Age: 56
End: 2021-07-22
Payer: MEDICAID

## 2021-07-22 VITALS
BODY MASS INDEX: 30.56 KG/M2 | WEIGHT: 179 LBS | OXYGEN SATURATION: 99 % | DIASTOLIC BLOOD PRESSURE: 107 MMHG | HEIGHT: 64 IN | HEART RATE: 69 BPM | SYSTOLIC BLOOD PRESSURE: 174 MMHG

## 2021-07-22 DIAGNOSIS — C73 MALIGNANT NEOPLASM OF THYROID GLAND: ICD-10-CM

## 2021-07-22 DIAGNOSIS — E03.9 HYPOTHYROIDISM, UNSPECIFIED: ICD-10-CM

## 2021-07-22 PROCEDURE — 99214 OFFICE O/P EST MOD 30 MIN: CPT | Mod: 25

## 2021-07-22 PROCEDURE — 36415 COLL VENOUS BLD VENIPUNCTURE: CPT

## 2021-07-22 PROCEDURE — 99072 ADDL SUPL MATRL&STAF TM PHE: CPT

## 2021-07-23 ENCOUNTER — NON-APPOINTMENT (OUTPATIENT)
Age: 56
End: 2021-07-23

## 2021-07-23 LAB
25(OH)D3 SERPL-MCNC: 58.8 NG/ML
ALBUMIN SERPL ELPH-MCNC: 4.9 G/DL
ALP BLD-CCNC: 96 U/L
ALT SERPL-CCNC: 11 U/L
ANION GAP SERPL CALC-SCNC: 14 MMOL/L
AST SERPL-CCNC: 20 U/L
BILIRUB SERPL-MCNC: 0.7 MG/DL
BUN SERPL-MCNC: 19 MG/DL
CALCIUM SERPL-MCNC: 8.5 MG/DL
CHLORIDE SERPL-SCNC: 105 MMOL/L
CO2 SERPL-SCNC: 22 MMOL/L
CREAT SERPL-MCNC: 0.99 MG/DL
GLUCOSE SERPL-MCNC: 94 MG/DL
POTASSIUM SERPL-SCNC: 4.7 MMOL/L
PROT SERPL-MCNC: 7.3 G/DL
SODIUM SERPL-SCNC: 142 MMOL/L
T3 SERPL-MCNC: 117 NG/DL
T4 FREE SERPL-MCNC: 1.4 NG/DL
THYROGLOB AB SERPL-ACNC: <20 IU/ML
THYROGLOB SERPL-MCNC: 0.59 NG/ML
TSH SERPL-ACNC: 0.32 UIU/ML

## 2021-07-23 NOTE — HISTORY OF PRESENT ILLNESS
[FreeTextEntry1] : Mr. INOCENCIA THRASHER is a 55 year old male with a past medical history of PTC s/p total thyroidectomy w/ b/l modified neck dissection (1/2020), KATE (7/2020) [id number] here for follow-up. Patient feels well and denies any complaints.\par \par Initial Hx: In December 2019, he noticed a lump on his neck. He went to  and he eventually had an US and a CT scan done. He then established with Dr Boykin . He had scheduled surgery on Jan 31, 2019. Post surgery he had hypocalcemia and the endocrine team was consulted. He states that he had minimal numbness and tingling around his mouth. He was started on calcium, calcitriol, ergocalciferol. Once his calcium were stable, he was discharged. His PTH was 8.02 with a calcium of 7.7 on discharge on 2/9. He denies any family history of thyroid disease, any exposure to radiation. 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 He was born in [facility]. He denies any perioral numbness or muscle spasm. \par \par Pathology: PTC, 6.9 x 3.5 x 1.8 cm, classical variant, focal tall cell morphology. 13/64 lymph nodes involved\par \par Interval HX (6/3/20):\par He had KATE uptake scan done in March 2020. He has followed up with Dr Boykin and was supposed to have his blood work done but never went in to draw. He is compliant with all the medications. He does complain of some fatigue currently. He denies any constipation, change in appetite, change in weight, numbness or tingling. He has tightness in the skin under his chin and some pain on the chest where the drains were in place.\par \par Interval Hx (8/4/20): Patient is here for follow-up. He did not receive my messages or mail and continued the Calcitriol and Calcium high dose. He had KATE treatment dose and post scan done last week (07/23/20- KATE with 150.3 mCi of I -131). Patient has anterior neck pain and decrease in sense of taste afterwards.\par \par Interval Hx (10/5/20): Patient is here follow-up. He stopped the Calcitriol and is taking calcium once daily. He has return of taste. He denies any tingling, change in appetite or weight changes. Patient is supposed to be on HTN medication but has not been taking it. His BP is elevated but her denies any HA, nausea or vomiting. \par \par Interval Hx (12/24/20): Patient is here follow-up. His BP still high. He is again admitted to not taking his medication regularly. He stopped Calcium\par

## 2021-07-23 NOTE — ASSESSMENT
[FreeTextEntry1] :  55 year old male with a past medical history of PTC s/p total thyroidectomy w/ b/l modified neck dissection (1/2020), KATE (7/2020) cb hypocalcemia\par \par 1. Papillary Thyroid Cancer\par Patient with differentiated PTC s/p total thyroidectomy and neck dissection, KATE\par [id number], CHIOMA intermediate Risk\par Patient has low Tg but not detectable. US shows 0.5 x 0.5 cm hyperechoic area in the right thyroidectomy bed. Repeat US shows no change\par Will send for WBS to be done before next follow up\par Cont Levothyroxine 150 mcg QD, Keeping TSH suppressed\par BP again very high. Will follow up with PCP\par \par 2. Vitamin D deficiency\par Cont D3 2000 QD\par \par

## 2021-08-31 NOTE — H&P PST ADULT - NSANTHNECKRD_ENT_A_CORE
Referred by: Chely Gonzalez MD; Medical Diagnosis (from order):    Diagnosis Information      Diagnosis    V12.09 (ICD-9-CM) - Z86.16 (ICD-10-CM) - History of 2019 novel coronavirus disease (COVID-19)    518.81, 079.89, 799.02 (ICD-9-CM) - U07.1, J96.01 (ICD-10-CM) - Acute hypoxemic respiratory failure due to COVID-19 (CMS/[facility])    799.3 (ICD-9-CM) - R53.81 (ICD-10-CM) - Physical deconditioning              Payor: [employer]/BS  Authorization Needed: Auth after eval through [facility]  Maximum Visit Limit Per Year: 20 visits per calender year   CoPay: $0  Notes:  Call Ref #: Online     Initial Evaluation -  Physical Therapy    Visit:  1   Treatment Diagnosis: Post COVID-19, symptoms with impaired posture, impaired mobility, impaired motor function/performance/coordination, impaired body mechanics, increased risk for falls, impaired activity tolerance, impaired gait, impaired strength  Chart reviewed at time of initial evaluation (relevant co-morbidities, allergies, tests and medications listed): Acute combined systolic/diastolic heart failure, elevated hemoglobin, history of COVID-19    SUBJECTIVE                                                                                                             Patient admitted to hospital on 7/26/2021 due to acute hypoxemic respiratory failure and sepsis secondary to COVID-19 pneumonia. He was intubated on 7/26/2021 and then extubated on 8/7/2021; discharged on 1L of oxygen at rest.     I have been monitoring my oxygen levels at home. When I'm at home I'll use the walker once in a while, but whenever I go out to the store or for appointments, I use the walker for safety. I am fully weaned off of oxygen. I do have an inhaler that I use in case I'm out of breath. I'm on blood thinners right now, so I'm trying to be cautious. My hands are shaking a lot ever since getting out of the hospital and it's difficult to write anything.   My arms get really tired when I try to hold them  up to wash my hair.   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 I am off of work right now.   Pain / Symptoms:Location: Breathing - lungs   Alleviating Factors: rest.   Function:   Limitations / Exacerbation Factors: difficulty, increased time, sleep disturbed, grooming/hygiene/self-care activities, grocery shopping, house/yard work, bending/squatting/lifting  Prior Level of Function: declining function, therefore referred to therapy,  Prior treatment: no therapies  Discharged from hospital, home health, or skilled nursing facility in last 30 days: yes  Home Environment:   Patient lives with significant other  Type of home: apartment (6 steps)  Assistance available: as needed  Feels safe at home/work/school.  2 or more falls or an unexplained fall with injury in the last year:  No    OBJECTIVE                                                                                                                     Observation:   Comments / Details: Patient has shortness of breath throughout session that worsens with any physical activity. Some wheezing also noted with wet cough.  Significant shortness of breath after ambulating 100 ft.   Observed Gait:   Assistive Device: wheeled walker Analysis: decreased henrique/pace;      Strength  (out of 5 unless noted, standard test position unless noted, lbs tested with hand held dynamometer)   Shoulder:     - Flexion:         • Left: 3+         • Right: 3+   Elbow/Forearm:    - Flexion:        • Left: 3+        • Right: 3+     - Extension:        • Left: 3+        • Right: 3+   Hip:    - Flexion:        • Left: 4-        • Right: 4-  Knee:    - Flexion:        • Left: 4        • Right: 4    - Extension:        • Left: 4-        • Right: 4-  : (lbs)    - Neutral:        • Left: Trial(s): 42, 42, 44, Average: 42.67        • Right: Trial(s): 40, 42, 40, Average: 40.67     norms: 30-34 years, male:  left 88.7-132.1, right 99.4-144.2; female: left 50.3-85.7, right 59.5-97.9          Balance Tests:   5 Times Sit to Stand: 21 (SpO2: 91%) sec    Greater than 16 seconds indicates fall risk in people with Parkinson's  Outcome Measures:   Lower Extremity Functional Scale: LEFS Calculated Total: 31 (0=extreme difficulty; 80=no difficulty) see flowsheet for additional documentation    TREATMENT                                                                                                                initial evaluation completed    Therapeutic Exercise:  Patient was educated with the functional condition related to the current diagnosis and how this relates to the expected prognosis and healing process.    Verbal and tactile instruction provided for the exercises below to ensure proper performance and muscular activation.  Unless otherwise noted, stretches held for 30 seconds x 2-3 sets and strength exercises performed 1-2 sets of 10 repetitions bilaterally.    *Indicates added to HEP with handout provided  -Patient requests exercises for hands to help with writing. Dispensed medium-soft pink putty and used to perform the following:    ~ squeezes    ~ squeezes, 5 sec holds*    ~ 3 point pinch*    ~ key pinch*    -sit to stand without use of hands, 5 times in a row  -Encouraged patient to start a walking program - starting with walking for 2-3 minutes in a row and increasing gradually as able      Skilled input: verbal instruction/cues, tactile instruction/cues, inhibition and facilitation    Writer verbally educated and received verbal consent for hand placement, positioning of patient, and techniques to be performed today from patient for hand placement and palpation for techniques and therapist position for techniques as described above and how they are pertinent to the patient's plan of care.    Home Exercise Program: Access Code: [email or web address]  URL: [email or web address]  Date:  08/31/2021  Prepared by: Monserrat Enriuqez    Exercises  Putty Squeezes - 1 x daily - 7 x weekly - 3 sets - 10 reps - 5 sec hold  3-Point Pinch with Putty - 1 x daily - 7 x weekly - 3 sets - 10 reps  Key Pinch with Putty - 1 x daily - 7 x weekly - 3 sets - 10 reps  Sit to Stand - 1 x daily - 7 x weekly - 3 sets - 10 reps       ASSESSMENT                                                                                                           31 year old male patient has reported functional limitations listed above impacted by signs and symptoms consistent with below   • Involved:       - Post COVID-19   • Symptoms/impairments: impaired posture, impaired mobility, impaired motor function/performance/coordination, impaired body mechanics, increased risk for falls, impaired activity tolerance, impaired gait and impaired strength    Patient presents with lower extremity and upper extremity weakness, significant shortness of breath, and difficulty ambulating community distances secondary to deconditioning following COVID-19 pneumonia. He also reports that it is difficult for him to complete ADLs such as washing his hair or completing writing tasks due to weakness and fatigue. He will benefit from skilled PT to improve cardiovascular fitness, lower and upper body strength, and ability to participate in daily tasks such as ADLs, housework, and grocery shopping.     Prognosis: patient will benefit from skilled therapy  Rehabilitative potential is: good  Predicted patient presentation: Moderate (evolving) - Patient comorbidities and complexities, as defined above, may have varying impact on steady progress for prescribed plan of care.  Patient Education:   Who will be receiving education: patient  Are they ready to learn: yes  Preferred learning style: written, verbal and demonstration  Barriers to learning: no barriers apparent at this time  Results of above outlined education: Verbalizes understanding and Demonstrates  understanding      PLAN                                                                                                                         The following skilled interventions to be implemented to achieve goals listed below:  Dry Needling  Gait Training (26113)  Manual Therapy (22952)  Neuromuscular Re-Education (88897)  Therapeutic Activity (89157)  Therapeutic Exercise (08093)  Electrical Stimulation (76731//46461)  Ultrasound/Phonophoresis (26796)    Frequency / Duration: 2 times per week tapering as patient progresses for an estimated total of 20 visits for 12 weeks    Patient involved in and agreed to plan of care and goals.  Patient given attendance policy at time of initial evaluation. and Attendance policy reviewed with patient.  Suggestions for next session as indicated: Progress per plan of care  6 minute walk test warm-up  Diaphragmatic breathing   Incentive spirometer  Standing strengthening and balance      GOALS                                                                                                                           Improve involved strength to 4+  The above improvements in impairments to assist in obtaining goals listed below  Long Term Goals: to be met by end of plan of care  1. Patient will stand for 30 minutes for completion of household tasks such as dishes, cleaning, and laundry.   2. Patient will ambulate 10 minutes, with reported manageable/tolerable pain and with reported manageable/tolerable difficulty and no assistive device to improve ability to go grocery shopping.   3. Lower Extremity Functional Scale: Patient will complete form to reflect an improved raw score to greater than or equal to 40/80 to indicate patient reported improvement in function/disability/impairment (minimal detectable change: 9 points).  4. Patient will be independent with progressed and modified home exercise program.      Therapy procedure time and total treatment time can be found documented  on the Time Entry flowsheet   No

## 2021-10-25 ENCOUNTER — APPOINTMENT (OUTPATIENT)
Dept: ENDOCRINOLOGY | Facility: CLINIC | Age: 56
End: 2021-10-25

## 2021-11-15 ENCOUNTER — OUTPATIENT (OUTPATIENT)
Dept: OUTPATIENT SERVICES | Facility: HOSPITAL | Age: 56
LOS: 1 days | End: 2021-11-15

## 2021-11-15 ENCOUNTER — RESULT REVIEW (OUTPATIENT)
Age: 56
End: 2021-11-15

## 2021-11-15 ENCOUNTER — APPOINTMENT (OUTPATIENT)
Dept: NUCLEAR MEDICINE | Facility: CLINIC | Age: 56
End: 2021-11-15
Payer: MEDICAID

## 2021-11-15 DIAGNOSIS — C73 MALIGNANT NEOPLASM OF THYROID GLAND: ICD-10-CM

## 2021-11-15 DIAGNOSIS — Z98.890 OTHER SPECIFIED POSTPROCEDURAL STATES: Chronic | ICD-10-CM

## 2021-11-16 ENCOUNTER — APPOINTMENT (OUTPATIENT)
Dept: NUCLEAR MEDICINE | Facility: CLINIC | Age: 56
End: 2021-11-16

## 2021-11-17 ENCOUNTER — APPOINTMENT (OUTPATIENT)
Dept: NUCLEAR MEDICINE | Facility: CLINIC | Age: 56
End: 2021-11-17

## 2021-11-19 ENCOUNTER — APPOINTMENT (OUTPATIENT)
Dept: NUCLEAR MEDICINE | Facility: CLINIC | Age: 56
End: 2021-11-19

## 2021-11-19 ENCOUNTER — LABORATORY RESULT (OUTPATIENT)
Age: 56
End: 2021-11-19

## 2021-11-22 ENCOUNTER — RESULT REVIEW (OUTPATIENT)
Age: 56
End: 2021-11-22

## 2021-11-22 PROCEDURE — 78016 THYROID MET IMAGING/STUDIES: CPT | Mod: 26

## 2021-11-22 PROCEDURE — 78018 THYROID MET IMAGING BODY: CPT | Mod: 26

## 2022-01-10 ENCOUNTER — RX RENEWAL (OUTPATIENT)
Age: 57
End: 2022-01-10

## 2022-01-10 RX ORDER — LEVOTHYROXINE SODIUM 0.15 MG/1
150 TABLET ORAL
Qty: 90 | Refills: 0 | Status: ACTIVE | COMMUNITY
Start: 2021-07-14 | End: 1900-01-01

## 2022-04-13 ENCOUNTER — APPOINTMENT (OUTPATIENT)
Dept: ENDOCRINOLOGY | Facility: CLINIC | Age: 57
End: 2022-04-13

## 2022-05-05 ENCOUNTER — RX RENEWAL (OUTPATIENT)
Age: 57
End: 2022-05-05

## 2022-05-09 ENCOUNTER — RX RENEWAL (OUTPATIENT)
Age: 57
End: 2022-05-09

## 2022-05-09 RX ORDER — LEVOTHYROXINE SODIUM 150 UG/1
150 TABLET ORAL
Qty: 90 | Refills: 0 | Status: ACTIVE | COMMUNITY
Start: 2022-05-09 | End: 1900-01-01

## 2022-05-18 ENCOUNTER — APPOINTMENT (OUTPATIENT)
Dept: ENDOCRINOLOGY | Facility: CLINIC | Age: 57
End: 2022-05-18

## 2022-10-08 NOTE — REASON FOR VISIT
[Subsequent Evaluation] : a subsequent evaluation for [Family Member] : family member [FreeTextEntry2] : s/p Total thyroidectomy with bilateral modified neck dissection levels II, III, and IV, 01/31/2020 20

## 2023-04-06 NOTE — ASU PREOP CHECKLIST - BOWEL PREP
----- Message from Derik Evans MD sent at 4/4/2023  5:11 PM CDT -----  Please notify the patient of normal results other than low white blood cell count. This has been low in the past and then normalized, so I think we can just continue to watch it with annual labs.   n/a

## 2024-07-29 ENCOUNTER — APPOINTMENT (RX ONLY)
Dept: URBAN - METROPOLITAN AREA CLINIC 151 | Facility: CLINIC | Age: 59
Setting detail: DERMATOLOGY
End: 2024-07-29

## 2024-07-29 DIAGNOSIS — L56.8 OTHER SPECIFIED ACUTE SKIN CHANGES DUE TO ULTRAVIOLET RADIATION: ICD-10-CM

## 2024-07-29 DIAGNOSIS — L70.0 ACNE VULGARIS: ICD-10-CM | Status: INADEQUATELY CONTROLLED

## 2024-07-29 DIAGNOSIS — D18.0 HEMANGIOMA: ICD-10-CM

## 2024-07-29 DIAGNOSIS — L81.4 OTHER MELANIN HYPERPIGMENTATION: ICD-10-CM

## 2024-07-29 DIAGNOSIS — B00.1 HERPESVIRAL VESICULAR DERMATITIS: ICD-10-CM

## 2024-07-29 DIAGNOSIS — L57.8 OTHER SKIN CHANGES DUE TO CHRONIC EXPOSURE TO NONIONIZING RADIATION: ICD-10-CM

## 2024-07-29 DIAGNOSIS — Z71.89 OTHER SPECIFIED COUNSELING: ICD-10-CM

## 2024-07-29 PROBLEM — D18.01 HEMANGIOMA OF SKIN AND SUBCUTANEOUS TISSUE: Status: ACTIVE | Noted: 2024-07-29

## 2024-07-29 PROCEDURE — 99204 OFFICE O/P NEW MOD 45 MIN: CPT

## 2024-07-29 PROCEDURE — ? COUNSELING

## 2024-07-29 PROCEDURE — ? SUNSCREEN RECOMMENDATIONS

## 2024-07-29 PROCEDURE — ? PRESCRIPTION

## 2024-07-29 RX ORDER — MINOCYCLINE HYDROCHLORIDE 50 MG/1
CAPSULE ORAL
Qty: 28 | Refills: 3 | Status: ERX | COMMUNITY
Start: 2024-07-29

## 2024-07-29 RX ADMIN — MINOCYCLINE HYDROCHLORIDE: 50 CAPSULE ORAL at 00:00

## 2024-07-29 ASSESSMENT — LOCATION DETAILED DESCRIPTION DERM
LOCATION DETAILED: EPIGASTRIC SKIN
LOCATION DETAILED: LEFT INFERIOR VERMILION LIP
LOCATION DETAILED: RIGHT PROXIMAL DORSAL FOREARM
LOCATION DETAILED: MID TRAPEZIAL NECK
LOCATION DETAILED: RIGHT SUPERIOR MEDIAL FOREHEAD
LOCATION DETAILED: RIGHT ULNAR DORSAL HAND
LOCATION DETAILED: RIGHT INFERIOR MEDIAL UPPER BACK

## 2024-07-29 ASSESSMENT — LOCATION ZONE DERM
LOCATION ZONE: LIP
LOCATION ZONE: TRUNK
LOCATION ZONE: FACE
LOCATION ZONE: HAND
LOCATION ZONE: ARM
LOCATION ZONE: NECK

## 2024-07-29 ASSESSMENT — LOCATION SIMPLE DESCRIPTION DERM
LOCATION SIMPLE: LEFT LIP
LOCATION SIMPLE: RIGHT UPPER BACK
LOCATION SIMPLE: RIGHT FOREARM
LOCATION SIMPLE: ABDOMEN
LOCATION SIMPLE: TRAPEZIAL NECK
LOCATION SIMPLE: RIGHT FOREHEAD
LOCATION SIMPLE: RIGHT HAND

## 2024-07-29 NOTE — PROCEDURE: COUNSELING
Detail Level: Generalized
Detail Level: Detailed
Aklief counseling:  Patient advised to apply a pea-sized amount only at bedtime and wait 30 minutes after washing their face before applying.  If too drying, patient may add a non-comedogenic moisturizer.  The most commonly reported side effects including irritation, redness, scaling, dryness, stinging, burning, itching, and increased risk of sunburn.  The patient verbalized understanding of the proper use and possible adverse effects of retinoids.  All of the patient's questions and concerns were addressed.
Topical Retinoid Pregnancy And Lactation Text: This medication is Pregnancy Category C. It is unknown if this medication is excreted in breast milk.
Winlevi Counseling:  I discussed with the patient the risks of topical clascoterone including but not limited to erythema, scaling, itching, and stinging. Patient voiced their understanding.
Bactrim Pregnancy And Lactation Text: This medication is Pregnancy Category D and is known to cause fetal risk.  It is also excreted in breast milk.
Erythromycin Counseling:  I discussed with the patient the risks of erythromycin including but not limited to GI upset, allergic reaction, drug rash, diarrhea, increase in liver enzymes, and yeast infections.
Minocycline Pregnancy And Lactation Text: This medication is Pregnancy Category D and not consider safe during pregnancy. It is also excreted in breast milk.
Include Pregnancy/Lactation Warning?: No
Doxycycline Counseling:  Patient counseled regarding possible photosensitivity and increased risk for sunburn.  Patient instructed to avoid sunlight, if possible.  When exposed to sunlight, patients should wear protective clothing, sunglasses, and sunscreen.  The patient was instructed to call the office immediately if the following severe adverse effects occur:  hearing changes, easy bruising/bleeding, severe headache, or vision changes.  The patient verbalized understanding of the proper use and possible adverse effects of doxycycline.  All of the patient's questions and concerns were addressed.
Tetracycline Counseling: Patient counseled regarding possible photosensitivity and increased risk for sunburn.  Patient instructed to avoid sunlight, if possible.  When exposed to sunlight, patients should wear protective clothing, sunglasses, and sunscreen.  The patient was instructed to call the office immediately if the following severe adverse effects occur:  hearing changes, easy bruising/bleeding, severe headache, or vision changes.  The patient verbalized understanding of the proper use and possible adverse effects of tetracycline.  All of the patient's questions and concerns were addressed. Patient understands to avoid pregnancy while on therapy due to potential birth defects.
Benzoyl Peroxide Pregnancy And Lactation Text: This medication is Pregnancy Category C. It is unknown if benzoyl peroxide is excreted in breast milk.
Topical Sulfur Applications Counseling: Topical Sulfur Counseling: Patient counseled that this medication may cause skin irritation or allergic reactions.  In the event of skin irritation, the patient was advised to reduce the amount of the drug applied or use it less frequently.   The patient verbalized understanding of the proper use and possible adverse effects of topical sulfur application.  All of the patient's questions and concerns were addressed.
High Dose Vitamin A Pregnancy And Lactation Text: High dose vitamin A therapy is contraindicated during pregnancy and breast feeding.
Dapsone Counseling: I discussed with the patient the risks of dapsone including but not limited to hemolytic anemia, agranulocytosis, rashes, methemoglobinemia, kidney failure, peripheral neuropathy, headaches, GI upset, and liver toxicity.  Patients who start dapsone require monitoring including baseline LFTs and weekly CBCs for the first month, then every month thereafter.  The patient verbalized understanding of the proper use and possible adverse effects of dapsone.  All of the patient's questions and concerns were addressed.
Azelaic Acid Pregnancy And Lactation Text: This medication is considered safe during pregnancy and breast feeding.
Spironolactone Counseling: Patient advised regarding risks of diarrhea, abdominal pain, hyperkalemia, birth defects (for female patients), liver toxicity and renal toxicity. The patient may need blood work to monitor liver and kidney function and potassium levels while on therapy. The patient verbalized understanding of the proper use and possible adverse effects of spironolactone.  All of the patient's questions and concerns were addressed.
Topical Clindamycin Counseling: Patient counseled that this medication may cause skin irritation or allergic reactions.  In the event of skin irritation, the patient was advised to reduce the amount of the drug applied or use it less frequently.   The patient verbalized understanding of the proper use and possible adverse effects of clindamycin.  All of the patient's questions and concerns were addressed.
Isotretinoin Pregnancy And Lactation Text: This medication is Pregnancy Category X and is considered extremely dangerous during pregnancy. It is unknown if it is excreted in breast milk.
Azithromycin Pregnancy And Lactation Text: This medication is considered safe during pregnancy and is also secreted in breast milk.
Winlevi Pregnancy And Lactation Text: This medication is considered safe during pregnancy and breastfeeding.
Erythromycin Pregnancy And Lactation Text: This medication is Pregnancy Category B and is considered safe during pregnancy. It is also excreted in breast milk.
Aklief Pregnancy And Lactation Text: It is unknown if this medication is safe to use during pregnancy.  It is unknown if this medication is excreted in breast milk.  Breastfeeding women should use the topical cream on the smallest area of the skin for the shortest time needed while breastfeeding.  Do not apply to nipple and areola.
Sarecycline Counseling: Patient advised regarding possible photosensitivity and discoloration of the teeth, skin, lips, tongue and gums.  Patient instructed to avoid sunlight, if possible.  When exposed to sunlight, patients should wear protective clothing, sunglasses, and sunscreen.  The patient was instructed to call the office immediately if the following severe adverse effects occur:  hearing changes, easy bruising/bleeding, severe headache, or vision changes.  The patient verbalized understanding of the proper use and possible adverse effects of sarecycline.  All of the patient's questions and concerns were addressed.
Tazorac Counseling:  Patient advised that medication is irritating and drying.  Patient may need to apply sparingly and wash off after an hour before eventually leaving it on overnight.  The patient verbalized understanding of the proper use and possible adverse effects of tazorac.  All of the patient's questions and concerns were addressed.
Minocycline Counseling: Patient advised regarding possible photosensitivity and discoloration of the teeth, skin, lips, tongue and gums.  Patient instructed to avoid sunlight, if possible.  When exposed to sunlight, patients should wear protective clothing, sunglasses, and sunscreen.  The patient was instructed to call the office immediately if the following severe adverse effects occur:  hearing changes, easy bruising/bleeding, severe headache, or vision changes.  The patient verbalized understanding of the proper use and possible adverse effects of minocycline.  All of the patient's questions and concerns were addressed.
Topical Retinoid counseling:  Patient advised to apply a pea-sized amount only at bedtime and wait 30 minutes after washing their face before applying.  If too drying, patient may add a non-comedogenic moisturizer. The patient verbalized understanding of the proper use and possible adverse effects of retinoids.  All of the patient's questions and concerns were addressed.
Topical Sulfur Applications Pregnancy And Lactation Text: This medication is Pregnancy Category C and has an unknown safety profile during pregnancy. It is unknown if this topical medication is excreted in breast milk.
Birth Control Pills Counseling: Birth Control Pill Counseling: I discussed with the patient the potential side effects of OCPs including but not limited to increased risk of stroke, heart attack, thrombophlebitis, deep venous thrombosis, hepatic adenomas, breast changes, GI upset, headaches, and depression.  The patient verbalized understanding of the proper use and possible adverse effects of OCPs. All of the patient's questions and concerns were addressed.
Bactrim Counseling:  I discussed with the patient the risks of sulfa antibiotics including but not limited to GI upset, allergic reaction, drug rash, diarrhea, dizziness, photosensitivity, and yeast infections.  Rarely, more serious reactions can occur including but not limited to aplastic anemia, agranulocytosis, methemoglobinemia, blood dyscrasias, liver or kidney failure, lung infiltrates or desquamative/blistering drug rashes.
Doxycycline Pregnancy And Lactation Text: This medication is Pregnancy Category D and not consider safe during pregnancy. It is also excreted in breast milk but is considered safe for shorter treatment courses.
Topical Clindamycin Pregnancy And Lactation Text: This medication is Pregnancy Category B and is considered safe during pregnancy. It is unknown if it is excreted in breast milk.
Benzoyl Peroxide Counseling: Patient counseled that medicine may cause skin irritation and bleach clothing.  In the event of skin irritation, the patient was advised to reduce the amount of the drug applied or use it less frequently.   The patient verbalized understanding of the proper use and possible adverse effects of benzoyl peroxide.  All of the patient's questions and concerns were addressed.
High Dose Vitamin A Counseling: Side effects reviewed, pt to contact office should one occur.
Spironolactone Pregnancy And Lactation Text: This medication can cause feminization of the male fetus and should be avoided during pregnancy. The active metabolite is also found in breast milk.
Dapsone Pregnancy And Lactation Text: This medication is Pregnancy Category C and is not considered safe during pregnancy or breast feeding.
Azithromycin Counseling:  I discussed with the patient the risks of azithromycin including but not limited to GI upset, allergic reaction, drug rash, diarrhea, and yeast infections.
Tazorac Pregnancy And Lactation Text: This medication is not safe during pregnancy. It is unknown if this medication is excreted in breast milk.
Birth Control Pills Pregnancy And Lactation Text: This medication should be avoided if pregnant and for the first 30 days post-partum.
Azelaic Acid Counseling: Patient counseled that medicine may cause skin irritation and to avoid applying near the eyes.  In the event of skin irritation, the patient was advised to reduce the amount of the drug applied or use it less frequently.   The patient verbalized understanding of the proper use and possible adverse effects of azelaic acid.  All of the patient's questions and concerns were addressed.
Isotretinoin Counseling: Patient should get monthly blood tests, not donate blood, not drive at night if vision affected, not share medication, and not undergo elective surgery for 6 months after tx completed. Side effects reviewed, pt to contact office should one occur.

## 2025-03-24 NOTE — ASU PATIENT PROFILE, ADULT - NSCAFFEINETYPE_GEN_ALL_CORE_SD
Speech Language Pathology: Orders received. Chart reviewed and discussed with care team.? Speech Language Pathology not indicated per hospitalist due to pt's current medical status. Per hospitalist, complete SLP orders at this time and they will consult again when pt becomes more medically appropriate. Will complete orders.      coffee